# Patient Record
Sex: MALE | Race: WHITE | NOT HISPANIC OR LATINO | Employment: FULL TIME | URBAN - METROPOLITAN AREA
[De-identification: names, ages, dates, MRNs, and addresses within clinical notes are randomized per-mention and may not be internally consistent; named-entity substitution may affect disease eponyms.]

---

## 2018-01-30 ENCOUNTER — CONVERSION ENCOUNTER (OUTPATIENT)
Dept: NEUROLOGY | Facility: CLINIC | Age: 38
End: 2018-01-30

## 2018-01-30 ENCOUNTER — OFFICE VISIT CONVERTED (OUTPATIENT)
Dept: NEUROSURGERY | Facility: CLINIC | Age: 38
End: 2018-01-30
Attending: NEUROLOGICAL SURGERY

## 2018-02-27 ENCOUNTER — OFFICE VISIT CONVERTED (OUTPATIENT)
Dept: NEUROSURGERY | Facility: CLINIC | Age: 38
End: 2018-02-27
Attending: NEUROLOGICAL SURGERY

## 2018-04-23 ENCOUNTER — OFFICE VISIT CONVERTED (OUTPATIENT)
Dept: GASTROENTEROLOGY | Facility: CLINIC | Age: 38
End: 2018-04-23
Attending: PHYSICIAN ASSISTANT

## 2018-05-03 ENCOUNTER — OFFICE VISIT CONVERTED (OUTPATIENT)
Dept: NEUROSURGERY | Facility: CLINIC | Age: 38
End: 2018-05-03
Attending: NEUROLOGICAL SURGERY

## 2019-07-29 ENCOUNTER — HOSPITAL ENCOUNTER (OUTPATIENT)
Dept: LAB | Facility: HOSPITAL | Age: 39
Discharge: HOME | End: 2019-07-29
Attending: NURSE PRACTITIONER

## 2019-07-29 ENCOUNTER — OFFICE VISIT CONVERTED (OUTPATIENT)
Dept: FAMILY MEDICINE CLINIC | Facility: CLINIC | Age: 39
End: 2019-07-29
Attending: NURSE PRACTITIONER

## 2019-07-29 ENCOUNTER — CONVERSION ENCOUNTER (OUTPATIENT)
Dept: FAMILY MEDICINE CLINIC | Facility: CLINIC | Age: 39
End: 2019-07-29

## 2019-07-29 LAB
ALBUMIN SERPL-MCNC: 4.3 G/DL (ref 3.5–5)
ALBUMIN/GLOB SERPL: 1.3 {RATIO} (ref 1.4–2.6)
ALP SERPL-CCNC: 97 U/L (ref 53–128)
ALT SERPL-CCNC: 27 U/L (ref 10–40)
ANION GAP SERPL CALC-SCNC: 18 MMOL/L (ref 8–19)
APPEARANCE UR: CLEAR
AST SERPL-CCNC: 19 U/L (ref 15–50)
BILIRUB SERPL-MCNC: 0.29 MG/DL (ref 0.2–1.3)
BILIRUB UR QL: NEGATIVE
BUN SERPL-MCNC: 13 MG/DL (ref 5–25)
BUN/CREAT SERPL: 16 {RATIO} (ref 6–20)
CALCIUM SERPL-MCNC: 9.4 MG/DL (ref 8.7–10.4)
CHLORIDE SERPL-SCNC: 105 MMOL/L (ref 99–111)
CHOLEST SERPL-MCNC: 225 MG/DL (ref 107–200)
CHOLEST/HDLC SERPL: 5.5 {RATIO} (ref 3–6)
COLOR UR: YELLOW
CONV CO2: 25 MMOL/L (ref 22–32)
CONV COLLECTION SOURCE (UA): NORMAL
CONV TOTAL PROTEIN: 7.6 G/DL (ref 6.3–8.2)
CONV UROBILINOGEN IN URINE BY AUTOMATED TEST STRIP: 0.2 {EHRLICHU}/DL (ref 0.1–1)
CREAT UR-MCNC: 0.79 MG/DL (ref 0.7–1.2)
FOLATE SERPL-MCNC: 11.2 NG/ML (ref 4.8–20)
GFR SERPLBLD BASED ON 1.73 SQ M-ARVRAT: >60 ML/MIN/{1.73_M2}
GLOBULIN UR ELPH-MCNC: 3.3 G/DL (ref 2–3.5)
GLUCOSE SERPL-MCNC: 85 MG/DL (ref 70–99)
GLUCOSE UR QL: NEGATIVE MG/DL
HDLC SERPL-MCNC: 41 MG/DL (ref 40–60)
HGB UR QL STRIP: NEGATIVE
KETONES UR QL STRIP: NEGATIVE MG/DL
LDLC SERPL CALC-MCNC: 149 MG/DL (ref 70–100)
LEUKOCYTE ESTERASE UR QL STRIP: NEGATIVE
NITRITE UR QL STRIP: NEGATIVE
OSMOLALITY SERPL CALC.SUM OF ELEC: 297 MOSM/KG (ref 273–304)
PH UR STRIP.AUTO: 6 [PH] (ref 5–8)
POTASSIUM SERPL-SCNC: 3.9 MMOL/L (ref 3.5–5.3)
PROT UR QL: NEGATIVE MG/DL
SODIUM SERPL-SCNC: 144 MMOL/L (ref 135–147)
SP GR UR: 1.02 (ref 1–1.03)
T4 FREE SERPL-MCNC: 1.1 NG/DL (ref 0.9–1.8)
TRIGL SERPL-MCNC: 174 MG/DL (ref 40–150)
TSH SERPL-ACNC: 3.7 M[IU]/L (ref 0.27–4.2)
VIT B12 SERPL-MCNC: 286 PG/ML (ref 211–911)
VLDLC SERPL-MCNC: 35 MG/DL (ref 5–37)

## 2020-01-29 ENCOUNTER — OFFICE VISIT CONVERTED (OUTPATIENT)
Dept: FAMILY MEDICINE CLINIC | Facility: CLINIC | Age: 40
End: 2020-01-29
Attending: NURSE PRACTITIONER

## 2020-02-04 ENCOUNTER — HOSPITAL ENCOUNTER (OUTPATIENT)
Dept: LAB | Facility: HOSPITAL | Age: 40
Discharge: HOME | End: 2020-02-04
Attending: NURSE PRACTITIONER

## 2020-02-04 ENCOUNTER — HOSPITAL ENCOUNTER (OUTPATIENT)
Dept: SLEEP MEDICINE | Facility: HOSPITAL | Age: 40
Discharge: HOME | End: 2020-02-04
Attending: PSYCHIATRY & NEUROLOGY

## 2020-02-04 LAB
ALBUMIN SERPL-MCNC: 4.6 G/DL (ref 3.5–5)
ALBUMIN/GLOB SERPL: 1.2 {RATIO} (ref 1.4–2.6)
ALP SERPL-CCNC: 101 U/L (ref 53–128)
ALT SERPL-CCNC: 35 U/L (ref 10–40)
ANION GAP SERPL CALC-SCNC: 21 MMOL/L (ref 8–19)
AST SERPL-CCNC: 23 U/L (ref 15–50)
BASOPHILS # BLD AUTO: 0.06 10*3/UL (ref 0–0.2)
BASOPHILS NFR BLD AUTO: 0.6 % (ref 0–3)
BILIRUB SERPL-MCNC: 0.33 MG/DL (ref 0.2–1.3)
BUN SERPL-MCNC: 12 MG/DL (ref 5–25)
BUN/CREAT SERPL: 12 {RATIO} (ref 6–20)
CALCIUM SERPL-MCNC: 10.1 MG/DL (ref 8.7–10.4)
CHLORIDE SERPL-SCNC: 98 MMOL/L (ref 99–111)
CHOLEST SERPL-MCNC: 254 MG/DL (ref 107–200)
CHOLEST/HDLC SERPL: 6 {RATIO} (ref 3–6)
CONV ABS IMM GRAN: 0.06 10*3/UL (ref 0–0.2)
CONV CO2: 25 MMOL/L (ref 22–32)
CONV IMMATURE GRAN: 0.6 % (ref 0–1.8)
CONV TOTAL PROTEIN: 8.3 G/DL (ref 6.3–8.2)
CREAT UR-MCNC: 1.01 MG/DL (ref 0.7–1.2)
DEPRECATED RDW RBC AUTO: 42.6 FL (ref 35.1–43.9)
EOSINOPHIL # BLD AUTO: 0.04 10*3/UL (ref 0–0.7)
EOSINOPHIL # BLD AUTO: 0.4 % (ref 0–7)
ERYTHROCYTE [DISTWIDTH] IN BLOOD BY AUTOMATED COUNT: 13.2 % (ref 11.6–14.4)
FOLATE SERPL-MCNC: 12.8 NG/ML (ref 4.8–20)
GFR SERPLBLD BASED ON 1.73 SQ M-ARVRAT: >60 ML/MIN/{1.73_M2}
GLOBULIN UR ELPH-MCNC: 3.7 G/DL (ref 2–3.5)
GLUCOSE SERPL-MCNC: 101 MG/DL (ref 70–99)
HCT VFR BLD AUTO: 51.6 % (ref 42–52)
HDLC SERPL-MCNC: 42 MG/DL (ref 40–60)
HGB BLD-MCNC: 16.6 G/DL (ref 14–18)
LDLC SERPL CALC-MCNC: 173 MG/DL (ref 70–100)
LYMPHOCYTES # BLD AUTO: 3.36 10*3/UL (ref 1–5)
LYMPHOCYTES NFR BLD AUTO: 35.3 % (ref 20–45)
MCH RBC QN AUTO: 28.3 PG (ref 27–31)
MCHC RBC AUTO-ENTMCNC: 32.2 G/DL (ref 33–37)
MCV RBC AUTO: 88.1 FL (ref 80–96)
MONOCYTES # BLD AUTO: 0.72 10*3/UL (ref 0.2–1.2)
MONOCYTES NFR BLD AUTO: 7.6 % (ref 3–10)
NEUTROPHILS # BLD AUTO: 5.29 10*3/UL (ref 2–8)
NEUTROPHILS NFR BLD AUTO: 55.5 % (ref 30–85)
NRBC CBCN: 0 % (ref 0–0.7)
OSMOLALITY SERPL CALC.SUM OF ELEC: 290 MOSM/KG (ref 273–304)
PLATELET # BLD AUTO: 324 10*3/UL (ref 130–400)
PMV BLD AUTO: 10.6 FL (ref 9.4–12.4)
POTASSIUM SERPL-SCNC: 3.7 MMOL/L (ref 3.5–5.3)
RBC # BLD AUTO: 5.86 10*6/UL (ref 4.7–6.1)
SODIUM SERPL-SCNC: 140 MMOL/L (ref 135–147)
T4 FREE SERPL-MCNC: 1.1 NG/DL (ref 0.9–1.8)
TESTOST SERPL-MCNC: 265 NG/DL (ref 249–836)
TRIGL SERPL-MCNC: 194 MG/DL (ref 40–150)
TSH SERPL-ACNC: 4.69 M[IU]/L (ref 0.27–4.2)
VIT B12 SERPL-MCNC: 312 PG/ML (ref 211–911)
VLDLC SERPL-MCNC: 39 MG/DL (ref 5–37)
WBC # BLD AUTO: 9.53 10*3/UL (ref 4.8–10.8)

## 2020-10-23 ENCOUNTER — OFFICE VISIT CONVERTED (OUTPATIENT)
Dept: FAMILY MEDICINE CLINIC | Facility: CLINIC | Age: 40
End: 2020-10-23
Attending: NURSE PRACTITIONER

## 2020-10-23 ENCOUNTER — CONVERSION ENCOUNTER (OUTPATIENT)
Dept: FAMILY MEDICINE CLINIC | Facility: CLINIC | Age: 40
End: 2020-10-23

## 2020-10-30 ENCOUNTER — HOSPITAL ENCOUNTER (OUTPATIENT)
Dept: LAB | Facility: HOSPITAL | Age: 40
Discharge: HOME | End: 2020-10-30
Attending: NURSE PRACTITIONER

## 2020-10-30 LAB
ALBUMIN SERPL-MCNC: 4.3 G/DL (ref 3.5–5)
ALBUMIN/GLOB SERPL: 1.2 {RATIO} (ref 1.4–2.6)
ALP SERPL-CCNC: 102 U/L (ref 53–128)
ALT SERPL-CCNC: 32 U/L (ref 10–40)
ANION GAP SERPL CALC-SCNC: 20 MMOL/L (ref 8–19)
APPEARANCE UR: ABNORMAL
AST SERPL-CCNC: 21 U/L (ref 15–50)
BASOPHILS # BLD AUTO: 0.07 10*3/UL (ref 0–0.2)
BASOPHILS NFR BLD AUTO: 0.7 % (ref 0–3)
BILIRUB SERPL-MCNC: 0.47 MG/DL (ref 0.2–1.3)
BILIRUB UR QL: NEGATIVE
BUN SERPL-MCNC: 17 MG/DL (ref 5–25)
BUN/CREAT SERPL: 19 {RATIO} (ref 6–20)
CALCIUM SERPL-MCNC: 9.4 MG/DL (ref 8.7–10.4)
CHLORIDE SERPL-SCNC: 98 MMOL/L (ref 99–111)
CHOLEST SERPL-MCNC: 224 MG/DL (ref 107–200)
CHOLEST/HDLC SERPL: 4.9 {RATIO} (ref 3–6)
COLOR UR: YELLOW
CONV ABS IMM GRAN: 0.04 10*3/UL (ref 0–0.2)
CONV BACTERIA: NEGATIVE
CONV CO2: 25 MMOL/L (ref 22–32)
CONV COLLECTION SOURCE (UA): ABNORMAL
CONV HYALINE CASTS IN URINE MICRO: ABNORMAL /[LPF]
CONV IMMATURE GRAN: 0.4 % (ref 0–1.8)
CONV TOTAL PROTEIN: 7.8 G/DL (ref 6.3–8.2)
CONV UROBILINOGEN IN URINE BY AUTOMATED TEST STRIP: 0.2 {EHRLICHU}/DL (ref 0.1–1)
CREAT UR-MCNC: 0.88 MG/DL (ref 0.7–1.2)
DEPRECATED RDW RBC AUTO: 42.2 FL (ref 35.1–43.9)
EOSINOPHIL # BLD AUTO: 0.07 10*3/UL (ref 0–0.7)
EOSINOPHIL # BLD AUTO: 0.7 % (ref 0–7)
ERYTHROCYTE [DISTWIDTH] IN BLOOD BY AUTOMATED COUNT: 13.3 % (ref 11.6–14.4)
GFR SERPLBLD BASED ON 1.73 SQ M-ARVRAT: >60 ML/MIN/{1.73_M2}
GLOBULIN UR ELPH-MCNC: 3.5 G/DL (ref 2–3.5)
GLUCOSE SERPL-MCNC: 89 MG/DL (ref 70–99)
GLUCOSE UR QL: NEGATIVE MG/DL
HCT VFR BLD AUTO: 49.3 % (ref 42–52)
HDLC SERPL-MCNC: 46 MG/DL (ref 40–60)
HGB BLD-MCNC: 16.1 G/DL (ref 14–18)
HGB UR QL STRIP: NEGATIVE
KETONES UR QL STRIP: NEGATIVE MG/DL
LDLC SERPL CALC-MCNC: 150 MG/DL (ref 70–100)
LEUKOCYTE ESTERASE UR QL STRIP: NEGATIVE
LYMPHOCYTES # BLD AUTO: 3.55 10*3/UL (ref 1–5)
LYMPHOCYTES NFR BLD AUTO: 35 % (ref 20–45)
MCH RBC QN AUTO: 28.4 PG (ref 27–31)
MCHC RBC AUTO-ENTMCNC: 32.7 G/DL (ref 33–37)
MCV RBC AUTO: 86.9 FL (ref 80–96)
MONOCYTES # BLD AUTO: 0.92 10*3/UL (ref 0.2–1.2)
MONOCYTES NFR BLD AUTO: 9.1 % (ref 3–10)
NEUTROPHILS # BLD AUTO: 5.5 10*3/UL (ref 2–8)
NEUTROPHILS NFR BLD AUTO: 54.1 % (ref 30–85)
NITRITE UR QL STRIP: NEGATIVE
NRBC CBCN: 0 % (ref 0–0.7)
OSMOLALITY SERPL CALC.SUM OF ELEC: 291 MOSM/KG (ref 273–304)
PH UR STRIP.AUTO: 6 [PH] (ref 5–8)
PLATELET # BLD AUTO: 286 10*3/UL (ref 130–400)
PMV BLD AUTO: 11.1 FL (ref 9.4–12.4)
POTASSIUM SERPL-SCNC: 3.2 MMOL/L (ref 3.5–5.3)
PROT UR QL: 30 MG/DL
RBC # BLD AUTO: 5.67 10*6/UL (ref 4.7–6.1)
RBC #/AREA URNS HPF: ABNORMAL /[HPF]
SODIUM SERPL-SCNC: 140 MMOL/L (ref 135–147)
SP GR UR: 1.02 (ref 1–1.03)
T4 FREE SERPL-MCNC: 1.3 NG/DL (ref 0.9–1.8)
TRIGL SERPL-MCNC: 141 MG/DL (ref 40–150)
TSH SERPL-ACNC: 4.56 M[IU]/L (ref 0.27–4.2)
VLDLC SERPL-MCNC: 28 MG/DL (ref 5–37)
WBC # BLD AUTO: 10.15 10*3/UL (ref 4.8–10.8)
WBC #/AREA URNS HPF: ABNORMAL /[HPF]

## 2021-01-22 ENCOUNTER — OFFICE VISIT CONVERTED (OUTPATIENT)
Dept: FAMILY MEDICINE CLINIC | Facility: CLINIC | Age: 41
End: 2021-01-22
Attending: NURSE PRACTITIONER

## 2021-02-24 ENCOUNTER — HOSPITAL ENCOUNTER (OUTPATIENT)
Dept: LAB | Facility: HOSPITAL | Age: 41
Discharge: HOME | End: 2021-02-24
Attending: NURSE PRACTITIONER

## 2021-02-24 LAB
ALBUMIN SERPL-MCNC: 4.1 G/DL (ref 3.5–5)
ALBUMIN/GLOB SERPL: 1.2 {RATIO} (ref 1.4–2.6)
ALP SERPL-CCNC: 93 U/L (ref 53–128)
ALT SERPL-CCNC: 33 U/L (ref 10–40)
ANION GAP SERPL CALC-SCNC: 17 MMOL/L (ref 8–19)
AST SERPL-CCNC: 20 U/L (ref 15–50)
BILIRUB SERPL-MCNC: 0.33 MG/DL (ref 0.2–1.3)
BUN SERPL-MCNC: 16 MG/DL (ref 5–25)
BUN/CREAT SERPL: 17 {RATIO} (ref 6–20)
CALCIUM SERPL-MCNC: 9.3 MG/DL (ref 8.7–10.4)
CHLORIDE SERPL-SCNC: 100 MMOL/L (ref 99–111)
CHOLEST SERPL-MCNC: 244 MG/DL (ref 107–200)
CHOLEST/HDLC SERPL: 5.2 {RATIO} (ref 3–6)
CONV CO2: 29 MMOL/L (ref 22–32)
CONV TOTAL PROTEIN: 7.5 G/DL (ref 6.3–8.2)
CREAT UR-MCNC: 0.94 MG/DL (ref 0.7–1.2)
GFR SERPLBLD BASED ON 1.73 SQ M-ARVRAT: >60 ML/MIN/{1.73_M2}
GLOBULIN UR ELPH-MCNC: 3.4 G/DL (ref 2–3.5)
GLUCOSE SERPL-MCNC: 95 MG/DL (ref 70–99)
HDLC SERPL-MCNC: 47 MG/DL (ref 40–60)
LDLC SERPL CALC-MCNC: 152 MG/DL (ref 70–100)
OSMOLALITY SERPL CALC.SUM OF ELEC: 295 MOSM/KG (ref 273–304)
POTASSIUM SERPL-SCNC: 3.7 MMOL/L (ref 3.5–5.3)
SODIUM SERPL-SCNC: 142 MMOL/L (ref 135–147)
T4 FREE SERPL-MCNC: 1.2 NG/DL (ref 0.9–1.8)
TRIGL SERPL-MCNC: 226 MG/DL (ref 40–150)
TSH SERPL-ACNC: 3 M[IU]/L (ref 0.27–4.2)
VLDLC SERPL-MCNC: 45 MG/DL (ref 5–37)

## 2021-05-13 NOTE — PROGRESS NOTES
Progress Note      Patient Name: Fernando Verde   Patient ID: [id number]   Sex: Male   YOB: 1980    Primary Care Provider: Luis Armando BELTRAN    Visit Date: October 23, 2020    Provider: DEVIN Kraft   Location: [facility]   Location Address: [address] [place] [address], [address]  [place], [place]  [id number]   Location Phone: [phone]          Chief Complaint     The patient is here for a physical       History Of Present Illness  Fernando Verde is a 40 year old /White male who presents for evaluation and treatment of:      Hyperlipidemia:  hasn't taken medication in awhile    Hypertension:122/82.  Doing well on medication.    Arthralgia:  doing well on medication.    Weight loss :  has concern about weight and would like to discuss med options. Has tried to do other weight manage diets.    Depression: Anything that feels good wife is against.  Wife doesn't want him to work. Wife doesn't want him to get sleep study due to money.  has been able to hide but not lately.       Past Medical History  Disease Name Date Onset Notes   Allergic conjunctivitis and rhinitis --  --    Arthritis --  --    Asthma --  --    B12 deficiency anemia 01/29/2020 --    Broken Bones --  --    Depressive Disorder --  --    Essential hypertension 01/29/2020 --    Forgetfulness --  --    Frequent urination --  --    Hearing loss --  --    Hemorrhoids --  --    Hyperlipidemia --  --    Hyperlipidemia 01/29/2020 --    Hypertension --  --    Lumbago/low back pain 07/20/2016 --    Migraine headache --  --    Night sweats --  --    Otalgia --  --    Recurrent otitis media --  --    Sciatica 07/20/2016 bilateral   Shortness of Breath --  --    Spondylolisthesis , lumbar 07/27/2016 --    Tinnitus, left ear --  --          Past Surgical History  Procedure Name Date Notes   Arm Surgery 2014,2015 --    Colonoscopy 2017 --    EGD 2017 --    Elbow Surgery 2014, 2016 --    Radial Fracture 2013 --     Removal of hardware 2015 --          Medication List  Name Date Started Instructions   atorvastatin 10 mg oral tablet 02/05/2020 take 1 tablet (10 mg) by oral route once daily at bedtime for 30 days   hydrochlorothiazide 25 mg oral tablet 01/29/2020 take 1 tablet (25 mg) by oral route once daily for 90 days   ibuprofen 800 mg oral tablet  take 1 tablet by oral route 3 times a day as needed   Synthroid 25 mcg oral tablet 02/05/2020 take 1 tablet (25 mcg) by oral route once daily on an empty stomach 30 minutes before breakfast for 30 days         Allergy List  Allergen Name Date Reaction Notes   lisinopril 08/2015 cough --          Family Medical History  Disease Name Relative/Age Notes   Breast Neoplasm, Malignant Grandmother (maternal)/   grandparent/not specified   Psychiatric disorder Mother/   --    Lung Neoplasm, Malignant Grandmother (maternal)/   grandparent/not specified   Stroke Grandfather (maternal)/   grandparents/not specified   Heart Disease Grandfather (paternal)/   grandparents/not specified   - No Family History of Colorectal Cancer  --    Leukemia Aunt/   --    Diabetes Grandfather (paternal)/  Grandmother (maternal)/   grandparents/not specified         Social History  Finding Status Start/Stop Quantity Notes   Alcohol Current some day --/-- occasional --     --  --/-- --  three children   Tobacco Former 14/36 --  Quit 2017   Unemployed --  --/-- --  --          Immunizations  NameDate Admin Mfg Trade Name Lot Number Route Inj VIS Given VIS Publication   Olxexvivi29/13/2020 SKB Fluarix, quadrivalent, preservative free [id number] NE NE 10/23/2020    Comments:    Eidotwdfhkgh62/24/2015 MSD PNEUMOVAX 23 [id number] NE  10/27/2015    Comments: [facility]         Review of Systems  · Constitutional  o Denies  o : fever, fatigue, weight loss, weight gain  · Cardiovascular  o Denies  o : lower extremity edema, claudication, chest pressure, palpitations  · Respiratory  o Denies  o : shortness  of breath, wheezing, cough, hemoptysis, dyspnea on exertion  · Gastrointestinal  o Denies  o : nausea, vomiting, diarrhea, constipation, abdominal pain      Vitals  Date Time BP Position Site L\R Cuff Size HR RR TEMP (F) WT  HT  BMI kg/m2 BSA m2 O2 Sat FR L/min FiO2 HC       10/23/2020 08:06 /82 Sitting    83 - R 16 98.2 274lbs 0oz 6'   37.16 2.51 97 %  21%          Physical Examination  · Constitutional  o Appearance  o : well-nourished, well developed, alert, in no acute distress  · Eyes  o Conjunctivae  o : conjunctivae normal  o Sclerae  o : sclerae white  o Pupils and Irises  o : pupils equal, round, and reactive to light and accommodation bilaterally  o Corneas  o : tear film normal, no lesions present  o Eyelids/Ocular Adnexae  o : eyelid appearance normal, no exudates present, eye moisture level normal  · Respiratory  o Respiratory Effort  o : breathing unlabored  o Inspection of Chest  o : normal appearance, no retractions  o Auscultation of Lungs  o : normal breath sounds throughout  · Cardiovascular  o Heart  o :   § Auscultation of Heart  § : regular rate and rhythm without murmur, PMI normal  o Peripheral Vascular System  o :   § Extremities  § : no cyanosis, clubbing or edema; less than 2 second refill noted  · Musculoskeletal  o General  o : No joint swelling or deformity noted. Muscle tone, strength and development grossly normal.  · Skin and Subcutaneous Tissue  o General Inspection  o : no rashes or lesions present, no areas of discoloration  · Neurologic  o Mental Status Examination  o : judgement, insight intact, modd and affect appropriate  o Motor Examination  o : strength grossly intact in all four extremities  o Gait and Station  o : normal gait, able to stand without difficulty          Assessment  · Screening for depression     V79.0/Z13.89  · Essential hypertension     401.9/I10  · Major depressive disorder     296.20/F32.2  · Obesity     278.00/E66.9  · Other long term (current)  drug therapy     V58.69/Z79.899  · BMI 37.0-37.9, adult     V85.37/Z68.37  · Elevated TSH     794.5/R79.89  · Elevated cholesterol     272.0/E78.00      Plan  · Orders  o Annual depression screening, 15 minutes (84574, ) - V79.0/Z13.89 - 10/23/2020  o ACO-18: Positive screen for clinical depression using a standardized tool and a follow-up plan documented () - V79.0/Z13.89 - 10/23/2020  o HTN/Lipid Panel (CMP, Lipid) [facility] (13409, 57495) - 401.9/I10 - 10/23/2020  o CBC with Auto Diff [facility] (30879) - 401.9/I10 - 10/23/2020  o Urinalysis with Reflex Microscopy ([facility]) (19786) - 401.9/I10 - 10/23/2020  o Thyroid Profile (49951, 32027, THYII) - 794.5/R79.89 - 10/23/2020  o ACO-39: Current medications updated and reviewed (1159F, ) - - 10/23/2020  o ACO-14: Influenza immunization administered or previously received [facility] () - - 10/23/2020  · Medications  o Contrave 8-90 mg oral tablet extended release   SIG: take 2 tablets by oral route 2 times per day in the morning and evening for 30 days   DISP: (120) Tablet with 2 refills  Prescribed on 10/23/2020     o ibuprofen 800 mg oral tablet   SIG: take 1 tablet by oral route 3 times a day as needed for 90 days   DISP: (270) Tablet with 1 refills  Prescribed on 10/23/2020     o atorvastatin 10 mg oral tablet   SIG: take 1 tablet (10 mg) by oral route once daily at bedtime for 90 days   DISP: (90) Tablet with 1 refills  Refilled on 10/23/2020     o hydrochlorothiazide 25 mg oral tablet   SIG: take 1 tablet (25 mg) by oral route once daily for 90 days   DISP: (90) Tablet with 1 refills  Refilled on 10/23/2020     o Medications have been Reconciled  o Transition of Care or Provider Policy  · Instructions  o Depression Screen completed and scanned into the EMR under the designated folder within the patient's documents.  o Today's PHQ-9 result is _18__  o The provider screening met the required time of 15 minutes.  o Patient was educated/instructed on their diagnosis,  treatment and medications prior to discharge from the clinic today.  o Minutes spent with patient including greater than 50% in Education/Counseling/Care Coordination.  o Time spent with the patient was minutes, more than 50% face to face.  · Disposition  o Return in 3 months for F/U            Electronically Signed by: DEVIN Kraft -Author on October 23, 2020 09:15:29 AM

## 2021-05-14 VITALS
HEIGHT: 72 IN | HEART RATE: 83 BPM | RESPIRATION RATE: 16 BRPM | BODY MASS INDEX: 37.11 KG/M2 | OXYGEN SATURATION: 97 % | TEMPERATURE: 98.2 F | WEIGHT: 274 LBS | SYSTOLIC BLOOD PRESSURE: 124 MMHG | DIASTOLIC BLOOD PRESSURE: 82 MMHG

## 2021-05-14 VITALS
WEIGHT: 274 LBS | DIASTOLIC BLOOD PRESSURE: 84 MMHG | HEART RATE: 67 BPM | OXYGEN SATURATION: 96 % | BODY MASS INDEX: 37.11 KG/M2 | SYSTOLIC BLOOD PRESSURE: 132 MMHG | HEIGHT: 72 IN | RESPIRATION RATE: 16 BRPM | TEMPERATURE: 96.6 F

## 2021-05-14 NOTE — PROGRESS NOTES
Progress Note      Patient Name: Fernando Verde   Patient ID: [id number]   Sex: Male   YOB: 1980    Primary Care Provider: Luis Armando BELTRAN    Visit Date: January 22, 2021    Provider: DEVIN Kraft   Location: [facility]   Location Address: [address] [place] [address], [address]  [place], [place]  [id number]   Location Phone: [phone]          Chief Complaint     The patient is here for a three month f/u htn, hypothyroid, hyperlipidemia, weight management.       History Of Present Illness  Fernando Verde is a 40 year old /White male who presents for evaluation and treatment of:      3-month f/u. Patient states he has not been taking Atorvastatin, Contrave, or Synthroid for the last 3 months. He would like to see what new lab results are before re-starting these medications. Patient reports daytime sleepiness and snoring, would like to have a sleep study done.     Hyperlipidemia:  wants to recheck his cholesterol before restarting medication if needed.    Obesity:  difficulty getting medication from pharmacy due to hours of operation and pandemic restrictions per patient.  weight has remained the same.    Hypthyroidism.  would like labs before deciding to restart medication.  Has been off for 3 months       Past Medical History  Disease Name Date Onset Notes   Allergic conjunctivitis and rhinitis --  --    Arthritis --  --    Asthma --  --    B12 deficiency anemia 01/29/2020 --    Broken Bones --  --    Depressive Disorder --  --    Essential hypertension 01/29/2020 --    Forgetfulness --  --    Frequent urination --  --    Hearing loss --  --    Hemorrhoids --  --    Hyperlipidemia --  --    Hyperlipidemia 01/29/2020 --    Hypertension --  --    Lumbago/low back pain 07/20/2016 --    Migraine headache --  --    Night sweats --  --    Otalgia --  --    Recurrent otitis media --  --    Sciatica 07/20/2016 bilateral   Shortness of Breath --  --    Spondylolisthesis ,  lumbar 07/27/2016 --    Tinnitus, left ear --  --          Past Surgical History  Procedure Name Date Notes   Arm Surgery 2014,2015 --    Colonoscopy 2017 --    EGD 2017 --    Elbow Surgery 2014, 2016 --    Radial Fracture 2013 --    Removal of hardware 2015 --          Medication List  Name Date Started Instructions   atorvastatin 20 mg oral tablet 10/30/2020 take 1 tablet (20 mg) by oral route once daily for 90 days   Contrave 8-90 mg oral tablet extended release 10/23/2020 take 2 tablets by oral route 2 times per day in the morning and evening for 30 days   hydrochlorothiazide 25 mg oral tablet 10/23/2020 take 1 tablet (25 mg) by oral route once daily for 90 days   ibuprofen 800 mg oral tablet 10/23/2020 take 1 tablet by oral route 3 times a day as needed for 90 days   Synthroid 25 mcg oral tablet 10/30/2020 take 1 tablet (25 mcg) by oral route once daily on an empty stomach 30 minutes before breakfast for 30 days         Allergy List  Allergen Name Date Reaction Notes   lisinopril 08/2015 cough --          Family Medical History  Disease Name Relative/Age Notes   Breast Neoplasm, Malignant Grandmother (maternal)/   grandparent/not specified   Psychiatric disorder Mother/   --    Lung Neoplasm, Malignant Grandmother (maternal)/   grandparent/not specified   Stroke Grandfather (maternal)/   grandparents/not specified   Heart Disease Grandfather (paternal)/   grandparents/not specified   - No Family History of Colorectal Cancer  --    Leukemia Aunt/   --    Diabetes Grandfather (paternal)/  Grandmother (maternal)/   grandparents/not specified         Social History  Finding Status Start/Stop Quantity Notes   Alcohol Current some day --/-- occasional --     --  --/-- --  three children   Tobacco Former 14/36 --  Quit 2017   Unemployed --  --/-- --  --          Immunizations  NameDate Admin Mfg Trade Name Lot Number Route Inj VIS Given VIS Publication   Qpopehbqh98/13/2020 SKB Fluarix, quadrivalent,  preservative free [id number] NE NE 10/23/2020    Comments:    Lytslrusculq98/24/2015 MSD PNEUMOVAX 23 [id number] NE  10/27/2015    Comments: [facility]         Review of Systems  · Constitutional  o Denies  o : fever, fatigue, weight loss, weight gain  · Cardiovascular  o Denies  o : lower extremity edema, claudication, chest pressure, palpitations  · Respiratory  o Denies  o : shortness of breath, wheezing, cough, hemoptysis, dyspnea on exertion  · Gastrointestinal  o Denies  o : nausea, vomiting, diarrhea, constipation, abdominal pain      Vitals  Date Time BP Position Site L\R Cuff Size HR RR TEMP (F) WT  HT  BMI kg/m2 BSA m2 O2 Sat FR L/min FiO2 HC       01/22/2021 08:34 /84 Sitting    67 - R 16 96.6 274lbs 0oz 6'   37.16 2.51 96 %  21%          Physical Examination  · Constitutional  o Appearance  o : well-nourished, well developed, alert, in no acute distress  · Eyes  o Conjunctivae  o : conjunctivae normal  o Sclerae  o : sclerae white  o Pupils and Irises  o : pupils equal, round, and reactive to light and accommodation bilaterally  o Corneas  o : tear film normal, no lesions present  o Eyelids/Ocular Adnexae  o : eyelid appearance normal, no exudates present, eye moisture level normal  · Neck  o Inspection/Palpation  o : normal appearance, no masses or tenderness, trachea midline, no enlarged cervical or supraclavicular lymphnodes palpated  o Thyroid  o : gland size normal, nontender, no nodules or masses present on palpation, thyroid motion normal during swallowing  · Respiratory  o Respiratory Effort  o : breathing unlabored  o Inspection of Chest  o : normal appearance, no retractions  o Auscultation of Lungs  o : normal breath sounds throughout  · Cardiovascular  o Heart  o :   § Auscultation of Heart  § : regular rate and rhythm without murmur, PMI normal  o Peripheral Vascular System  o :   § Carotid Arteries  § : normal pulses bilaterally, no bruits present  § Extremities  § : no cyanosis,  clubbing or edema; less than 2 second refill noted  · Musculoskeletal  o General  o : No joint swelling or deformity noted. Muscle tone, strength and development grossly normal.  · Skin and Subcutaneous Tissue  o General Inspection  o : no rashes or lesions present, no areas of discoloration  · Neurologic  o Mental Status Examination  o : judgement, insight intact, modd and affect appropriate  o Motor Examination  o : strength grossly intact in all four extremities  o Gait and Station  o : normal gait, able to stand without difficulty          Assessment  · Hyperlipidemia     272.4/E78.5  · Hypothyroidism     244.9/E03.9  · Obesity     278.00/E66.9  · BMI 36.0-36.9,adult     V85.36/Z68.36  · Snoring     786.09/R06.83  · Daytime sleepiness     780.54/R40.0      Plan  · Orders  o HTN/Lipid Panel (CMP, Lipid) [facility] (71670, 71773) - 272.4/E78.5 - 01/22/2021  o Thyroid Profile (73869, THYII, 30348) - 244.9/E03.9 - 01/22/2021  o ACO-14: Influenza immunization administered or previously received [facility] () - - 01/22/2021  o ACO-39: Current medications updated and reviewed (, 1159F) - - 01/22/2021  o Sleep Disorder Clinic Consultation (SLEEP) - 786.09/R06.83, 780.54/R40.0 - 01/22/2021  · Medications  o Medications have been Reconciled  o Transition of Care or Provider Policy  · Instructions  o Advised that cheeses and other sources of dairy fats, animal fats, fast food, and the extras (candy, pastries, pies, doughnuts and cookies) all contain LDL raising nutrients. Advised to increase fruits, vegetables, whole grains, and to monitor portion sizes.   o Patient was educated/instructed on their diagnosis, treatment and medications prior to discharge from the clinic today.  o Minutes spent with patient including greater than 50% in Education/Counseling/Care Coordination.  o Time spent with the patient was minutes, more than 50% face to face.  · Disposition  o Return in 6 months            Electronically Signed by: Luis Armando  DEVIN Padron -Author on January 22, 2021 09:28:11 AM

## 2021-05-15 VITALS
HEART RATE: 101 BPM | WEIGHT: 272 LBS | OXYGEN SATURATION: 95 % | RESPIRATION RATE: 16 BRPM | SYSTOLIC BLOOD PRESSURE: 146 MMHG | BODY MASS INDEX: 36.84 KG/M2 | HEIGHT: 72 IN | TEMPERATURE: 98.3 F | DIASTOLIC BLOOD PRESSURE: 110 MMHG

## 2021-05-15 VITALS
HEIGHT: 72 IN | SYSTOLIC BLOOD PRESSURE: 144 MMHG | HEART RATE: 71 BPM | WEIGHT: 266 LBS | DIASTOLIC BLOOD PRESSURE: 105 MMHG | TEMPERATURE: 96.9 F | OXYGEN SATURATION: 96 % | RESPIRATION RATE: 16 BRPM | BODY MASS INDEX: 36.03 KG/M2

## 2021-05-15 VITALS — SYSTOLIC BLOOD PRESSURE: 138 MMHG | DIASTOLIC BLOOD PRESSURE: 84 MMHG

## 2021-05-16 VITALS
RESPIRATION RATE: 12 BRPM | WEIGHT: 274 LBS | HEIGHT: 72 IN | OXYGEN SATURATION: 97 % | BODY MASS INDEX: 37.11 KG/M2 | DIASTOLIC BLOOD PRESSURE: 92 MMHG | SYSTOLIC BLOOD PRESSURE: 130 MMHG | HEART RATE: 80 BPM

## 2021-05-16 VITALS
DIASTOLIC BLOOD PRESSURE: 81 MMHG | SYSTOLIC BLOOD PRESSURE: 121 MMHG | WEIGHT: 269 LBS | HEIGHT: 72 IN | BODY MASS INDEX: 36.44 KG/M2

## 2021-05-16 VITALS
BODY MASS INDEX: 36.57 KG/M2 | SYSTOLIC BLOOD PRESSURE: 157 MMHG | WEIGHT: 270 LBS | DIASTOLIC BLOOD PRESSURE: 97 MMHG | HEIGHT: 72 IN

## 2021-05-16 VITALS
BODY MASS INDEX: 36.7 KG/M2 | HEIGHT: 72 IN | WEIGHT: 271 LBS | SYSTOLIC BLOOD PRESSURE: 119 MMHG | DIASTOLIC BLOOD PRESSURE: 84 MMHG

## 2021-06-10 DIAGNOSIS — I10 HYPERTENSION, UNSPECIFIED TYPE: Primary | ICD-10-CM

## 2021-06-10 NOTE — TELEPHONE ENCOUNTER
Caller: Fernando Verde    Relationship: Self    Best call back number: [phone]    Medication needed:   HYDROCHLOROTHIAZIDE    When do you need the refill by: ASAP      Does the patient have less than a 3 day supply:  [x] Yes  [] No    What is the patient's preferred pharmacy: [facility]  [place]

## 2021-06-12 RX ORDER — HYDROCHLOROTHIAZIDE 25 MG/1
25 TABLET ORAL DAILY
Qty: 90 TABLET | Refills: 1 | Status: SHIPPED | OUTPATIENT
Start: 2021-06-12 | End: 2021-07-22 | Stop reason: SDUPTHER

## 2021-06-12 RX ORDER — HYDROCHLOROTHIAZIDE 25 MG/1
TABLET ORAL
COMMUNITY
Start: 2021-04-02 | End: 2021-06-12 | Stop reason: SDUPTHER

## 2021-06-23 ENCOUNTER — TRANSCRIBE ORDERS (OUTPATIENT)
Dept: ADMINISTRATIVE | Facility: HOSPITAL | Age: 41
End: 2021-06-23

## 2021-06-23 ENCOUNTER — LAB (OUTPATIENT)
Dept: LAB | Facility: HOSPITAL | Age: 41
End: 2021-06-23

## 2021-06-23 DIAGNOSIS — Z13.9 ENCOUNTER FOR HEALTH-RELATED SCREENING: Primary | ICD-10-CM

## 2021-06-23 DIAGNOSIS — Z13.9 ENCOUNTER FOR HEALTH-RELATED SCREENING: ICD-10-CM

## 2021-06-23 LAB — HBV SURFACE AB SER RIA-ACNC: NORMAL

## 2021-06-23 PROCEDURE — 36415 COLL VENOUS BLD VENIPUNCTURE: CPT

## 2021-06-23 PROCEDURE — 86706 HEP B SURFACE ANTIBODY: CPT

## 2021-07-22 ENCOUNTER — OFFICE VISIT (OUTPATIENT)
Dept: FAMILY MEDICINE CLINIC | Facility: CLINIC | Age: 41
End: 2021-07-22

## 2021-07-22 VITALS
BODY MASS INDEX: 37.52 KG/M2 | HEIGHT: 72 IN | HEART RATE: 78 BPM | OXYGEN SATURATION: 96 % | WEIGHT: 277 LBS | SYSTOLIC BLOOD PRESSURE: 118 MMHG | TEMPERATURE: 97.1 F | DIASTOLIC BLOOD PRESSURE: 86 MMHG | RESPIRATION RATE: 16 BRPM

## 2021-07-22 DIAGNOSIS — E78.2 MIXED HYPERLIPIDEMIA: ICD-10-CM

## 2021-07-22 DIAGNOSIS — F41.8 SITUATIONAL ANXIETY: Primary | ICD-10-CM

## 2021-07-22 DIAGNOSIS — I10 HYPERTENSION, UNSPECIFIED TYPE: ICD-10-CM

## 2021-07-22 DIAGNOSIS — I10 ESSENTIAL HYPERTENSION: ICD-10-CM

## 2021-07-22 PROBLEM — H91.90 HEARING LOSS: Status: ACTIVE | Noted: 2021-07-22

## 2021-07-22 PROBLEM — G43.909 MIGRAINE HEADACHE: Status: ACTIVE | Noted: 2021-07-22

## 2021-07-22 PROBLEM — J45.909 ASTHMA: Status: ACTIVE | Noted: 2021-07-22

## 2021-07-22 PROBLEM — T14.8XXA BROKEN BONES: Status: ACTIVE | Noted: 2021-07-22

## 2021-07-22 PROBLEM — H93.12 TINNITUS, LEFT EAR: Status: ACTIVE | Noted: 2021-07-22

## 2021-07-22 PROBLEM — R35.0 FREQUENT URINATION: Status: ACTIVE | Noted: 2021-07-22

## 2021-07-22 PROBLEM — E53.8 VITAMIN B12 DEFICIENCY: Status: ACTIVE | Noted: 2021-07-22

## 2021-07-22 PROBLEM — M41.9 SCOLIOSIS: Status: ACTIVE | Noted: 2021-07-22

## 2021-07-22 PROBLEM — E55.9 VITAMIN D DEFICIENCY: Status: ACTIVE | Noted: 2021-07-22

## 2021-07-22 PROBLEM — F32.A DEPRESSIVE DISORDER: Status: ACTIVE | Noted: 2021-07-22

## 2021-07-22 PROBLEM — D51.9 B12 DEFICIENCY ANEMIA: Status: ACTIVE | Noted: 2020-01-29

## 2021-07-22 PROBLEM — R68.89 FORGETFULNESS: Status: ACTIVE | Noted: 2021-07-22

## 2021-07-22 PROBLEM — K64.9 HEMORRHOIDS: Status: ACTIVE | Noted: 2021-07-22

## 2021-07-22 PROBLEM — R06.02 SHORTNESS OF BREATH: Status: ACTIVE | Noted: 2021-07-22

## 2021-07-22 PROBLEM — Z88.9 ALLERGIC CONDITION: Status: ACTIVE | Noted: 2021-07-22

## 2021-07-22 PROBLEM — H66.90 RECURRENT OTITIS MEDIA: Status: ACTIVE | Noted: 2021-07-22

## 2021-07-22 PROBLEM — E78.5 HYPERLIPIDEMIA: Status: ACTIVE | Noted: 2020-01-29

## 2021-07-22 PROCEDURE — 99214 OFFICE O/P EST MOD 30 MIN: CPT | Performed by: NURSE PRACTITIONER

## 2021-07-22 RX ORDER — ATORVASTATIN CALCIUM 20 MG/1
20 TABLET, FILM COATED ORAL DAILY
Qty: 30 TABLET | Refills: 2 | Status: SHIPPED | OUTPATIENT
Start: 2021-07-22 | End: 2021-11-01 | Stop reason: SDUPTHER

## 2021-07-22 RX ORDER — ESCITALOPRAM OXALATE 10 MG/1
10 TABLET ORAL DAILY
Qty: 30 TABLET | Refills: 1 | Status: SHIPPED | OUTPATIENT
Start: 2021-07-22 | End: 2021-10-02 | Stop reason: SDUPTHER

## 2021-07-22 RX ORDER — BUSPIRONE HYDROCHLORIDE 5 MG/1
5 TABLET ORAL 3 TIMES DAILY PRN
Qty: 90 TABLET | Refills: 1 | Status: SHIPPED | OUTPATIENT
Start: 2021-07-22 | End: 2023-01-31

## 2021-07-22 RX ORDER — HYDROCHLOROTHIAZIDE 25 MG/1
25 TABLET ORAL DAILY
Qty: 90 TABLET | Refills: 1 | OUTPATIENT
Start: 2021-07-22 | End: 2022-03-06

## 2021-07-22 NOTE — PROGRESS NOTES
Chief Complaint  Hypertension (f/u) and Hyperlipidemia (f/u)    Subjective        Fernando Verde presents to [facility]  Had a sudden termination at work.    Anxiety has been elevated because has to deal with situation.  Has a new job and is doing better except for the having situation he is in that may be resolved in the next few weeks.    Working at the hospital and liking the job.  Still has some fleeting thoughts of hurting himself but has no plan.      Hypertension  Pertinent negatives include no chest pain, palpitations or shortness of breath.   Hyperlipidemia  Pertinent negatives include no chest pain or shortness of breath.         Past History:    Medical History: has a past medical history of Allergic conjunctivitis and rhinitis, Arthritis, Asthma, B12 deficiency anemia (01/29/2020), Bilateral sciatica (07/20/2016), Broken bones, Depressive disorder, Essential hypertension (01/29/2020), Forgetfulness, Frequent urination, Hearing loss, Hemorrhoids, HLD (hyperlipidemia) (01/29/2020), HTN (hypertension), Lumbago (07/20/2016), Migraine headache, Night sweats, Otalgia, Recurrent otitis media, SOB (shortness of breath), Spondylolisthesis, lumbar region (07/27/2016), and Tinnitus, left ear.     Surgical History: has a past surgical history that includes Other surgical history (2014, 2015); Colonoscopy (2017); Esophagogastroduodenoscopy (2017); Elbow Arthroplasty (2014, 2016); Closed reduction radial / ulnar shaft fracture (2013); and Hardware Removal (2015).     Family History: family history includes Breast cancer in his maternal grandmother; COPD in his paternal grandfather; Diabetes in his maternal grandmother and paternal grandfather; Heart disease in his paternal grandfather; Leukemia in his maternal aunt; Lung cancer in his maternal grandmother; Mental illness in his mother; Stroke in his maternal grandfather.     Social History: reports that he quit smoking about 4 years  "ago. His smoking use included cigarettes. He has never used smokeless tobacco. He reports current alcohol use. He reports that he does not use drugs.    Allergies: Lisinopril          Current Outpatient Medications:   •  atorvastatin (LIPITOR) 20 MG tablet, Take 1 tablet by mouth Daily., Disp: 30 tablet, Rfl: 2  •  hydroCHLOROthiazide (HYDRODIURIL) 25 MG tablet, Take 1 tablet by mouth Daily., Disp: 90 tablet, Rfl: 1  •  ibuprofen (ADVIL,MOTRIN) 800 MG tablet, Take 1 tablet by mouth 3 (Three) Times a Day., Disp: 270 tablet, Rfl: 0  •  busPIRone (BUSPAR) 5 MG tablet, Take 1 tablet by mouth 3 (Three) Times a Day As Needed (anxiety)., Disp: 90 tablet, Rfl: 1  •  escitalopram (Lexapro) 10 MG tablet, Take 1 tablet by mouth Daily., Disp: 30 tablet, Rfl: 1    Medications Discontinued During This Encounter   Medication Reason   • atorvastatin (LIPITOR) 20 MG tablet Reorder   • hydroCHLOROthiazide (HYDRODIURIL) 25 MG tablet Reorder         Review of Systems   Constitutional: Negative for fever.   Respiratory: Negative for cough and shortness of breath.    Cardiovascular: Negative for chest pain, palpitations and leg swelling.   Neurological: Negative for dizziness, weakness, numbness and headache.        Objective         Vitals:    07/22/21 1020   BP: 118/86   BP Location: Right arm   Patient Position: Sitting   Cuff Size: Large Adult   Pulse: 78   Resp: 16   Temp: 97.1 °F (36.2 °C)   TempSrc: Infrared   SpO2: 96%   Weight: 126 kg (277 lb)   Height: 182.9 cm (72\")     Body mass index is 37.57 kg/m².         Physical Exam  Vitals reviewed.   Constitutional:       Appearance: Normal appearance. He is well-developed.   HENT:      Head: Normocephalic and atraumatic.      Mouth/Throat:      Pharynx: No oropharyngeal exudate.   Eyes:      Conjunctiva/sclera: Conjunctivae normal.      Pupils: Pupils are equal, round, and reactive to light.   Cardiovascular:      Rate and Rhythm: Normal rate and regular rhythm.      Heart sounds: No " murmur heard.   No friction rub. No gallop.    Pulmonary:      Effort: Pulmonary effort is normal.      Breath sounds: Normal breath sounds. No wheezing or rhonchi.   Skin:     General: Skin is warm and dry.   Neurological:      Mental Status: He is alert and oriented to person, place, and time.   Psychiatric:         Mood and Affect: Mood and affect normal.         Behavior: Behavior normal.         Thought Content: Thought content normal.         Judgment: Judgment normal.             Result Review :               Assessment and Plan     Diagnoses and all orders for this visit:    1. Situational anxiety (Primary)  -     escitalopram (Lexapro) 10 MG tablet; Take 1 tablet by mouth Daily.  Dispense: 30 tablet; Refill: 1  -     busPIRone (BUSPAR) 5 MG tablet; Take 1 tablet by mouth 3 (Three) Times a Day As Needed (anxiety).  Dispense: 90 tablet; Refill: 1    2. Essential hypertension  Comments:  well controlled on current dose and will continue current dose.    Orders:  -     Lipid Panel w/ Chol/HDL Ratio; Future  -     Comprehensive metabolic panel; Future  -     Urinalysis With Culture If Indicated -; Future  -     CBC No Differential; Future    3. Mixed hyperlipidemia  Comments:  well controlled on medication and will conintue  Orders:  -     atorvastatin (LIPITOR) 20 MG tablet; Take 1 tablet by mouth Daily.  Dispense: 30 tablet; Refill: 2    4. Hypertension, unspecified type  -     hydroCHLOROthiazide (HYDRODIURIL) 25 MG tablet; Take 1 tablet by mouth Daily.  Dispense: 90 tablet; Refill: 1              Follow Up     Return in about 2 months (around 9/22/2021) for Next scheduled follow up.    Patient was given instructions and counseling regarding his condition or for health maintenance advice. Please see specific information pulled into the AVS if appropriate.

## 2021-07-22 NOTE — PATIENT INSTRUCTIONS
"High Cholesterol    High cholesterol is a condition in which the blood has high levels of a white, waxy substance similar to fat (cholesterol). The liver makes all the cholesterol that the body needs. The human body needs small amounts of cholesterol to help build cells. A person gets extra or excess cholesterol from the food that he or she eats.  The blood carries cholesterol from the liver to the rest of the body. If you have high cholesterol, deposits (plaques) may build up on the walls of your arteries. Arteries are the blood vessels that carry blood away from your heart. These plaques make the arteries narrow and stiff.  Cholesterol plaques increase your risk for heart attack and stroke. Work with your health care provider to keep your cholesterol levels in a healthy range.  What increases the risk?  The following factors may make you more likely to develop this condition:  · Eating foods that are high in animal fat (saturated fat) or cholesterol.  · Being overweight.  · Not getting enough exercise.  · A family history of high cholesterol (familial hypercholesterolemia).  · Use of tobacco products.  · Having diabetes.  What are the signs or symptoms?  There are no symptoms of this condition.  How is this diagnosed?  This condition may be diagnosed based on the results of a blood test.  · If you are older than 20 years of age, your health care provider may check your cholesterol levels every 4-6 years.  · You may be checked more often if you have high cholesterol or other risk factors for heart disease.  The blood test for cholesterol measures:  · \"Bad\" cholesterol, or LDL cholesterol. This is the main type of cholesterol that causes heart disease. The desired level is less than 100 mg/dL.  · \"Good\" cholesterol, or HDL cholesterol. HDL helps protect against heart disease by cleaning the arteries and carrying the LDL to the liver for processing. The desired level for HDL is 60 mg/dL or higher.  · Triglycerides. " These are fats that your body can store or burn for energy. The desired level is less than 150 mg/dL.  · Total cholesterol. This measures the total amount of cholesterol in your blood and includes LDL, HDL, and triglycerides. The desired level is less than 200 mg/dL.  How is this treated?  This condition may be treated with:  · Diet changes. You may be asked to eat foods that have more fiber and less saturated fats or added sugar.  · Lifestyle changes. These may include regular exercise, maintaining a healthy weight, and quitting use of tobacco products.  · Medicines. These are given when diet and lifestyle changes have not worked. You may be prescribed a statin medicine to help lower your cholesterol levels.  Follow these instructions at home:  Eating and drinking    · Eat a healthy, balanced diet. This diet includes:  ? Daily servings of a variety of fresh, frozen, or canned fruits and vegetables.  ? Daily servings of whole grain foods that are rich in fiber.  ? Foods that are low in saturated fats and trans fats. These include poultry and fish without skin, lean cuts of meat, and low-fat dairy products.  ? A variety of fish, especially oily fish that contain omega-3 fatty acids. Aim to eat fish at least 2 times a week.  · Avoid foods and drinks that have added sugar.  · Use healthy cooking methods, such as roasting, grilling, broiling, baking, poaching, steaming, and stir-frying. Do not march your food except for stir-frying.  Lifestyle    · Get regular exercise. Aim to exercise for a total of 150 minutes a week. Increase your activity level by doing activities such as gardening, walking, and taking the stairs.  · Do not use any products that contain nicotine or tobacco, such as cigarettes, e-cigarettes, and chewing tobacco. If you need help quitting, ask your health care provider.  General instructions  · Take over-the-counter and prescription medicines only as told by your health care provider.  · Keep all  "follow-up visits as told by your health care provider. This is important.  Where to find more information  · American Heart Association: www.heart.org  · National Heart, Lung, and [employer] [place]: www.nhlbi.nih.gov  Contact a health care provider if:  · You have trouble achieving or maintaining a healthy diet or weight.  · You are starting an exercise program.  · You are unable to stop smoking.  Get help right away if:  · You have chest pain.  · You have trouble breathing.  · You have any symptoms of a stroke. \"BE FAST\" is an easy way to remember the main warning signs of a stroke:  ? B - Balance. Signs are dizziness, sudden trouble walking, or loss of balance.  ? E - Eyes. Signs are trouble seeing or a sudden change in vision.  ? F - Face. Signs are sudden weakness or numbness of the face, or the face or eyelid drooping on one side.  ? A - Arms. Signs are weakness or numbness in an arm. This happens suddenly and usually on one side of the body.  ? S - Speech. Signs are sudden trouble speaking, slurred speech, or trouble understanding what people say.  ? T - Time. Time to call emergency services. Write down what time symptoms started.  · You have other signs of a stroke, such as:  ? A sudden, severe headache with no known cause.  ? Nausea or vomiting.  ? Seizure.  These symptoms may represent a serious problem that is an emergency. Do not wait to see if the symptoms will go away. Get medical help right away. Call your local emergency services (911 in the [place] Do not drive yourself to the hospital.  Summary  · Cholesterol plaques increase your risk for heart attack and stroke. Work with your health care provider to keep your cholesterol levels in a healthy range.  · Eat a healthy, balanced diet, get regular exercise, and maintain a healthy weight.  · Do not use any products that contain nicotine or tobacco, such as cigarettes, e-cigarettes, and chewing tobacco.  · Get help right away if you have any symptoms of a " stroke.  This information is not intended to replace advice given to you by your health care provider. Make sure you discuss any questions you have with your health care provider.  Document Revised: 11/16/2020 Document Reviewed: 11/16/2020  Elsevier Patient Education © 2021 [employer]

## 2021-09-13 ENCOUNTER — TRANSCRIBE ORDERS (OUTPATIENT)
Dept: OTHER | Facility: HOSPITAL | Age: 41
End: 2021-09-13

## 2021-09-13 ENCOUNTER — LAB (OUTPATIENT)
Dept: LAB | Facility: HOSPITAL | Age: 41
End: 2021-09-13

## 2021-09-13 DIAGNOSIS — Z13.9 ENCOUNTER FOR HEALTH-RELATED SCREENING: ICD-10-CM

## 2021-09-13 DIAGNOSIS — Z13.9 ENCOUNTER FOR HEALTH-RELATED SCREENING: Primary | ICD-10-CM

## 2021-09-13 LAB — HBV SURFACE AB SER RIA-ACNC: REACTIVE

## 2021-09-13 PROCEDURE — 86706 HEP B SURFACE ANTIBODY: CPT

## 2021-09-13 PROCEDURE — 36415 COLL VENOUS BLD VENIPUNCTURE: CPT

## 2021-09-28 ENCOUNTER — TRANSCRIBE ORDERS (OUTPATIENT)
Dept: ONCOLOGY | Facility: HOSPITAL | Age: 41
End: 2021-09-28

## 2021-09-28 ENCOUNTER — LAB (OUTPATIENT)
Dept: ONCOLOGY | Facility: HOSPITAL | Age: 41
End: 2021-09-28

## 2021-09-28 DIAGNOSIS — Z13.9 VISIT FOR SCREENING: Primary | ICD-10-CM

## 2021-09-28 DIAGNOSIS — Z13.9 VISIT FOR SCREENING: ICD-10-CM

## 2021-09-28 LAB
ALBUMIN SERPL-MCNC: 4.3 G/DL (ref 3.5–5.2)
ALBUMIN/GLOB SERPL: 1.5 G/DL
ALP SERPL-CCNC: 97 U/L (ref 39–117)
ALT SERPL W P-5'-P-CCNC: 26 U/L (ref 1–41)
ANION GAP SERPL CALCULATED.3IONS-SCNC: 9.1 MMOL/L (ref 5–15)
AST SERPL-CCNC: 16 U/L (ref 1–40)
BASOPHILS # BLD AUTO: 0.06 10*3/MM3 (ref 0–0.2)
BASOPHILS NFR BLD AUTO: 0.8 % (ref 0–1.5)
BILIRUB SERPL-MCNC: 0.3 MG/DL (ref 0–1.2)
BUN SERPL-MCNC: 16 MG/DL (ref 6–20)
BUN/CREAT SERPL: 20.3 (ref 7–25)
CALCIUM SPEC-SCNC: 9.1 MG/DL (ref 8.6–10.5)
CHLORIDE SERPL-SCNC: 105 MMOL/L (ref 98–107)
CHOLEST SERPL-MCNC: 158 MG/DL (ref 0–200)
CO2 SERPL-SCNC: 22.9 MMOL/L (ref 22–29)
CREAT SERPL-MCNC: 0.79 MG/DL (ref 0.76–1.27)
DEPRECATED RDW RBC AUTO: 42.4 FL (ref 37–54)
EOSINOPHIL # BLD AUTO: 0.08 10*3/MM3 (ref 0–0.4)
EOSINOPHIL NFR BLD AUTO: 1 % (ref 0.3–6.2)
ERYTHROCYTE [DISTWIDTH] IN BLOOD BY AUTOMATED COUNT: 13 % (ref 12.3–15.4)
GFR SERPL CREATININE-BSD FRML MDRD: 108 ML/MIN/1.73
GLOBULIN UR ELPH-MCNC: 2.9 GM/DL
GLUCOSE SERPL-MCNC: 105 MG/DL (ref 65–99)
HBA1C MFR BLD: 6.13 % (ref 4.8–5.6)
HCT VFR BLD AUTO: 46.2 % (ref 37.5–51)
HDLC SERPL-MCNC: 41 MG/DL (ref 40–60)
HGB BLD-MCNC: 15.1 G/DL (ref 13–17.7)
IMM GRANULOCYTES # BLD AUTO: 0.03 10*3/MM3 (ref 0–0.05)
IMM GRANULOCYTES NFR BLD AUTO: 0.4 % (ref 0–0.5)
LDLC SERPL CALC-MCNC: 94 MG/DL (ref 0–100)
LDLC/HDLC SERPL: 2.22 {RATIO}
LYMPHOCYTES # BLD AUTO: 2.65 10*3/MM3 (ref 0.7–3.1)
LYMPHOCYTES NFR BLD AUTO: 33.5 % (ref 19.6–45.3)
MCH RBC QN AUTO: 28.8 PG (ref 26.6–33)
MCHC RBC AUTO-ENTMCNC: 32.7 G/DL (ref 31.5–35.7)
MCV RBC AUTO: 88.2 FL (ref 79–97)
MONOCYTES # BLD AUTO: 0.54 10*3/MM3 (ref 0.1–0.9)
MONOCYTES NFR BLD AUTO: 6.8 % (ref 5–12)
NEUTROPHILS NFR BLD AUTO: 4.55 10*3/MM3 (ref 1.7–7)
NEUTROPHILS NFR BLD AUTO: 57.5 % (ref 42.7–76)
PLATELET # BLD AUTO: 271 10*3/MM3 (ref 140–450)
PMV BLD AUTO: 10.1 FL (ref 6–12)
POTASSIUM SERPL-SCNC: 3.5 MMOL/L (ref 3.5–5.2)
PROT SERPL-MCNC: 7.2 G/DL (ref 6–8.5)
RBC # BLD AUTO: 5.24 10*6/MM3 (ref 4.14–5.8)
SODIUM SERPL-SCNC: 137 MMOL/L (ref 136–145)
TRIGL SERPL-MCNC: 129 MG/DL (ref 0–150)
TSH SERPL DL<=0.05 MIU/L-ACNC: 2.68 UIU/ML (ref 0.27–4.2)
VLDLC SERPL-MCNC: 23 MG/DL (ref 5–40)
WBC # BLD AUTO: 7.91 10*3/MM3 (ref 3.4–10.8)

## 2021-09-28 PROCEDURE — 80053 COMPREHEN METABOLIC PANEL: CPT

## 2021-09-28 PROCEDURE — 80061 LIPID PANEL: CPT

## 2021-09-28 PROCEDURE — 83036 HEMOGLOBIN GLYCOSYLATED A1C: CPT

## 2021-09-28 PROCEDURE — 85025 COMPLETE CBC W/AUTO DIFF WBC: CPT

## 2021-09-28 PROCEDURE — 36415 COLL VENOUS BLD VENIPUNCTURE: CPT

## 2021-09-28 PROCEDURE — 84443 ASSAY THYROID STIM HORMONE: CPT

## 2021-09-28 RX ORDER — IBUPROFEN 800 MG/1
800 TABLET ORAL 3 TIMES DAILY
Qty: 270 TABLET | Refills: 0 | Status: CANCELLED | OUTPATIENT
Start: 2021-09-28

## 2021-09-30 RX ORDER — IBUPROFEN 800 MG/1
800 TABLET ORAL 3 TIMES DAILY
Qty: 270 TABLET | Refills: 1 | Status: SHIPPED | OUTPATIENT
Start: 2021-09-30 | End: 2022-02-10 | Stop reason: SDUPTHER

## 2021-10-02 DIAGNOSIS — F41.8 SITUATIONAL ANXIETY: ICD-10-CM

## 2021-10-04 RX ORDER — ESCITALOPRAM OXALATE 10 MG/1
10 TABLET ORAL DAILY
Qty: 90 TABLET | Refills: 1 | Status: SHIPPED | OUTPATIENT
Start: 2021-10-04 | End: 2022-02-02 | Stop reason: SDUPTHER

## 2021-11-01 DIAGNOSIS — E78.2 MIXED HYPERLIPIDEMIA: ICD-10-CM

## 2021-11-01 RX ORDER — ATORVASTATIN CALCIUM 20 MG/1
20 TABLET, FILM COATED ORAL DAILY
Qty: 30 TABLET | Refills: 2 | Status: SHIPPED | OUTPATIENT
Start: 2021-11-01 | End: 2022-02-10 | Stop reason: SDUPTHER

## 2022-01-20 ENCOUNTER — LAB (OUTPATIENT)
Dept: LAB | Facility: HOSPITAL | Age: 42
End: 2022-01-20

## 2022-01-20 DIAGNOSIS — I10 ESSENTIAL HYPERTENSION: ICD-10-CM

## 2022-01-20 LAB
ALBUMIN SERPL-MCNC: 4.3 G/DL (ref 3.5–5.2)
ALBUMIN/GLOB SERPL: 1.2 G/DL
ALP SERPL-CCNC: 94 U/L (ref 39–117)
ALT SERPL W P-5'-P-CCNC: 35 U/L (ref 1–41)
AMORPH URATE CRY URNS QL MICRO: ABNORMAL /HPF
ANION GAP SERPL CALCULATED.3IONS-SCNC: 10 MMOL/L (ref 5–15)
AST SERPL-CCNC: 29 U/L (ref 1–40)
BACTERIA UR QL AUTO: ABNORMAL /HPF
BILIRUB SERPL-MCNC: 0.4 MG/DL (ref 0–1.2)
BILIRUB UR QL STRIP: NEGATIVE
BUN SERPL-MCNC: 19 MG/DL (ref 6–20)
BUN/CREAT SERPL: 24.7 (ref 7–25)
CALCIUM SPEC-SCNC: 9.5 MG/DL (ref 8.6–10.5)
CHLORIDE SERPL-SCNC: 101 MMOL/L (ref 98–107)
CLARITY UR: ABNORMAL
CO2 SERPL-SCNC: 29 MMOL/L (ref 22–29)
COLOR UR: YELLOW
CREAT SERPL-MCNC: 0.77 MG/DL (ref 0.76–1.27)
DEPRECATED RDW RBC AUTO: 40.6 FL (ref 37–54)
ERYTHROCYTE [DISTWIDTH] IN BLOOD BY AUTOMATED COUNT: 13.1 % (ref 12.3–15.4)
GFR SERPL CREATININE-BSD FRML MDRD: 111 ML/MIN/1.73
GLOBULIN UR ELPH-MCNC: 3.5 GM/DL
GLUCOSE SERPL-MCNC: 97 MG/DL (ref 65–99)
GLUCOSE UR STRIP-MCNC: NEGATIVE MG/DL
HCT VFR BLD AUTO: 47.2 % (ref 37.5–51)
HGB BLD-MCNC: 15.4 G/DL (ref 13–17.7)
HGB UR QL STRIP.AUTO: NEGATIVE
HYALINE CASTS UR QL AUTO: ABNORMAL /LPF
KETONES UR QL STRIP: NEGATIVE
LEUKOCYTE ESTERASE UR QL STRIP.AUTO: NEGATIVE
MCH RBC QN AUTO: 27.8 PG (ref 26.6–33)
MCHC RBC AUTO-ENTMCNC: 32.6 G/DL (ref 31.5–35.7)
MCV RBC AUTO: 85.4 FL (ref 79–97)
NITRITE UR QL STRIP: NEGATIVE
PH UR STRIP.AUTO: 5.5 [PH] (ref 5–8)
PLATELET # BLD AUTO: 295 10*3/MM3 (ref 140–450)
PMV BLD AUTO: 10.9 FL (ref 6–12)
POTASSIUM SERPL-SCNC: 3.7 MMOL/L (ref 3.5–5.2)
PROT SERPL-MCNC: 7.8 G/DL (ref 6–8.5)
PROT UR QL STRIP: ABNORMAL
RBC # BLD AUTO: 5.53 10*6/MM3 (ref 4.14–5.8)
RBC # UR STRIP: ABNORMAL /HPF
REF LAB TEST METHOD: ABNORMAL
SODIUM SERPL-SCNC: 140 MMOL/L (ref 136–145)
SP GR UR STRIP: 1.03 (ref 1–1.03)
SQUAMOUS #/AREA URNS HPF: ABNORMAL /HPF
UROBILINOGEN UR QL STRIP: ABNORMAL
WBC # UR STRIP: ABNORMAL /HPF
WBC NRBC COR # BLD: 8.67 10*3/MM3 (ref 3.4–10.8)

## 2022-01-20 PROCEDURE — 87086 URINE CULTURE/COLONY COUNT: CPT

## 2022-01-20 PROCEDURE — 85027 COMPLETE CBC AUTOMATED: CPT

## 2022-01-20 PROCEDURE — 80053 COMPREHEN METABOLIC PANEL: CPT

## 2022-01-20 PROCEDURE — 36415 COLL VENOUS BLD VENIPUNCTURE: CPT

## 2022-01-20 PROCEDURE — 81001 URINALYSIS AUTO W/SCOPE: CPT

## 2022-01-22 LAB — BACTERIA SPEC AEROBE CULT: NO GROWTH

## 2022-01-24 ENCOUNTER — PATIENT MESSAGE (OUTPATIENT)
Dept: FAMILY MEDICINE CLINIC | Facility: CLINIC | Age: 42
End: 2022-01-24

## 2022-01-24 NOTE — TELEPHONE ENCOUNTER
From: Fernando Verde  To: DEVIN Kraft  Sent: 1/24/2022 7:03 AM EST  Subject: Question regarding URINALYSIS, MICROSCOPIC ONLY    What about the rest of the urinalysis results? The ones that say there was an issue

## 2022-01-26 ENCOUNTER — TRANSCRIBE ORDERS (OUTPATIENT)
Dept: LAB | Facility: HOSPITAL | Age: 42
End: 2022-01-26

## 2022-01-26 ENCOUNTER — LAB (OUTPATIENT)
Dept: LAB | Facility: HOSPITAL | Age: 42
End: 2022-01-26

## 2022-01-26 DIAGNOSIS — Z01.818 PRE-OP TESTING: Primary | ICD-10-CM

## 2022-01-26 DIAGNOSIS — Z01.818 PRE-OP TESTING: ICD-10-CM

## 2022-01-26 LAB — SARS-COV-2 RNA PNL SPEC NAA+PROBE: NOT DETECTED

## 2022-01-26 PROCEDURE — U0004 COV-19 TEST NON-CDC HGH THRU: HCPCS

## 2022-01-26 PROCEDURE — C9803 HOPD COVID-19 SPEC COLLECT: HCPCS

## 2022-02-02 ENCOUNTER — HOSPITAL ENCOUNTER (OUTPATIENT)
Dept: GENERAL RADIOLOGY | Facility: HOSPITAL | Age: 42
Discharge: HOME | End: 2022-02-02
Admitting: NURSE PRACTITIONER

## 2022-02-02 ENCOUNTER — OFFICE VISIT (OUTPATIENT)
Dept: FAMILY MEDICINE CLINIC | Facility: CLINIC | Age: 42
End: 2022-02-02

## 2022-02-02 VITALS
TEMPERATURE: 97.5 F | BODY MASS INDEX: 37.55 KG/M2 | HEART RATE: 96 BPM | OXYGEN SATURATION: 99 % | HEIGHT: 72 IN | SYSTOLIC BLOOD PRESSURE: 142 MMHG | DIASTOLIC BLOOD PRESSURE: 89 MMHG | WEIGHT: 277.2 LBS

## 2022-02-02 DIAGNOSIS — R53.83 FATIGUE, UNSPECIFIED TYPE: ICD-10-CM

## 2022-02-02 DIAGNOSIS — R20.2 NUMBNESS AND TINGLING IN LEFT ARM: ICD-10-CM

## 2022-02-02 DIAGNOSIS — R06.83 SNORING: Primary | ICD-10-CM

## 2022-02-02 DIAGNOSIS — F41.8 SITUATIONAL ANXIETY: ICD-10-CM

## 2022-02-02 DIAGNOSIS — R20.0 NUMBNESS AND TINGLING IN LEFT ARM: ICD-10-CM

## 2022-02-02 DIAGNOSIS — I10 PRIMARY HYPERTENSION: ICD-10-CM

## 2022-02-02 PROCEDURE — 99214 OFFICE O/P EST MOD 30 MIN: CPT | Performed by: NURSE PRACTITIONER

## 2022-02-02 PROCEDURE — 72050 X-RAY EXAM NECK SPINE 4/5VWS: CPT

## 2022-02-02 RX ORDER — ESCITALOPRAM OXALATE 20 MG/1
20 TABLET ORAL DAILY
Qty: 90 TABLET | Refills: 1 | Status: SHIPPED | OUTPATIENT
Start: 2022-02-02 | End: 2022-07-25 | Stop reason: SDUPTHER

## 2022-02-02 RX ORDER — LOSARTAN POTASSIUM 25 MG/1
25 TABLET ORAL DAILY
Qty: 90 TABLET | Refills: 1 | Status: SHIPPED | OUTPATIENT
Start: 2022-02-02 | End: 2022-03-28 | Stop reason: SDUPTHER

## 2022-02-02 NOTE — PROGRESS NOTES
"Chief Complaint  Insomnia and Hypertension    Subjective          Fernando Verde presents to [facility]  Patient presents to the office today requesting a referral to the sleep clinic. He does have a history of snoring and sleep apnea. He states that approximately 4 years ago he was diagnosed with apnea but has not been utilizing CPAP. He states that he does continue to have fatigue. Patient also states that his blood pressure has been running staying elevated. Blood pressure on arrival today is 142/89. He denies any chest pain shortness breath palpitations this time. He denies any headaches at this time.    Patient does state that he is on Lexapro 10 mg daily. He states that it works somewhat but is not as effective as he is helping. We did discuss increasing this to 20 mg. He also states that he has been having left arm paresthesias. He states that his wife has noticed him in his hand due to the numbness and tingling but he feels as if this is just become a habit to him because he is used to it. He denies any arm pain, arm swelling. Does state he has a history of a neck injury.      Objective   Vital Signs:   /89 (BP Location: Right arm, Patient Position: Sitting, Cuff Size: Adult)   Pulse 96   Temp 97.5 °F (36.4 °C) (Temporal)   Ht 182.9 cm (72.01\")   Wt 126 kg (277 lb 3.2 oz)   SpO2 99%   BMI 37.59 kg/m²     Physical Exam  Vitals reviewed.   Constitutional:       Appearance: Normal appearance.   Cardiovascular:      Rate and Rhythm: Normal rate and regular rhythm.      Heart sounds: Normal heart sounds, S1 normal and S2 normal. No murmur heard.      Pulmonary:      Effort: Pulmonary effort is normal. No respiratory distress.      Breath sounds: Normal breath sounds.   Skin:     General: Skin is warm and dry.   Neurological:      Mental Status: He is alert and oriented to person, place, and time.   Psychiatric:         Attention and Perception: Attention normal.  "        Mood and Affect: Mood normal.         Behavior: Behavior normal.        Result Review :                 Assessment and Plan    Diagnoses and all orders for this visit:    1. Snoring (Primary)  -     Ambulatory Referral to Sleep Medicine    2. Fatigue, unspecified type  -     Ambulatory Referral to Sleep Medicine    3. Primary hypertension  -     losartan (Cozaar) 25 MG tablet; Take 1 tablet by mouth Daily.  Dispense: 90 tablet; Refill: 1    4. Situational anxiety  -     escitalopram (Lexapro) 20 MG tablet; Take 1 tablet by mouth Daily.  Dispense: 90 tablet; Refill: 1    5. Numbness and tingling in left arm  -     XR Spine Cervical Complete 4 or 5 View; Future        Follow Up   Return in about 6 months (around 8/2/2022) for Recheck.  Patient was given instructions and counseling regarding his condition or for health maintenance advice. Please see specific information pulled into the AVS if appropriate.

## 2022-02-07 ENCOUNTER — TELEPHONE (OUTPATIENT)
Dept: FAMILY MEDICINE CLINIC | Facility: CLINIC | Age: 42
End: 2022-02-07

## 2022-02-07 NOTE — TELEPHONE ENCOUNTER
----- Message from DEVIN Weaver sent at 2/7/2022  1:00 PM EST -----  Anti-inflammatories would be the initial thing we will do on a  routine basis to see if this improves the symptoms.  ----- Message -----  From: Janeen Gurrola  Sent: 2/7/2022   8:41 AM EST  To: DEVIN Weaver    Called pt, results given. He wants to know what he can do to help this or if there is anything that can improve.

## 2022-02-09 ENCOUNTER — PATIENT MESSAGE (OUTPATIENT)
Dept: FAMILY MEDICINE CLINIC | Facility: CLINIC | Age: 42
End: 2022-02-09

## 2022-02-09 DIAGNOSIS — E78.2 MIXED HYPERLIPIDEMIA: ICD-10-CM

## 2022-02-10 RX ORDER — IBUPROFEN 800 MG/1
800 TABLET ORAL 3 TIMES DAILY
Qty: 270 TABLET | Refills: 1 | Status: SHIPPED | OUTPATIENT
Start: 2022-02-10 | End: 2022-08-02 | Stop reason: SDUPTHER

## 2022-02-10 RX ORDER — ATORVASTATIN CALCIUM 20 MG/1
20 TABLET, FILM COATED ORAL DAILY
Qty: 30 TABLET | Refills: 2 | Status: SHIPPED | OUTPATIENT
Start: 2022-02-10 | End: 2022-05-30 | Stop reason: SDUPTHER

## 2022-02-10 NOTE — TELEPHONE ENCOUNTER
From: Fernando Verde  To: DEVIN Weaver  Sent: 2/9/2022 11:43 PM EST  Subject: Prescription Refills    I have a few prescriptions that are close to being out of refills. They were last filled by Ms Padron. Could you send in refills? It would be for the Ibuprofen 800 mg and Atorvastatin 20 mg. You upped my Lexapro and we changed my BP med.

## 2022-03-15 ENCOUNTER — OFFICE VISIT (OUTPATIENT)
Dept: SLEEP MEDICINE | Facility: HOSPITAL | Age: 42
End: 2022-03-15

## 2022-03-15 VITALS
WEIGHT: 276 LBS | BODY MASS INDEX: 36.58 KG/M2 | OXYGEN SATURATION: 97 % | SYSTOLIC BLOOD PRESSURE: 149 MMHG | TEMPERATURE: 97.8 F | DIASTOLIC BLOOD PRESSURE: 99 MMHG | HEIGHT: 73 IN | HEART RATE: 84 BPM

## 2022-03-15 DIAGNOSIS — G47.33 OSA (OBSTRUCTIVE SLEEP APNEA): Primary | ICD-10-CM

## 2022-03-15 PROCEDURE — G0463 HOSPITAL OUTPT CLINIC VISIT: HCPCS | Performed by: PSYCHIATRY & NEUROLOGY

## 2022-03-15 NOTE — PROGRESS NOTES
[facility]    SLEEP CLINIC FOLLOW UP PROGRESS NOTE.    Fernando Verde  1980  41 y.o.  male      PCP: Luis Armando Padron APRN      Date of visit: 3/15/2022    The patient is returning for follow-up visit.  Reason for follow-up visit: Snoring, frequently waking up and headaches.    HPI:  This is a 41 y.o. years old patient who has a history of obstructive sleep apnea.  Review of previous medical records shows that he was initially seen at the  [facility] because of complaints of difficulty initiating sleep, fatigue, low energy and  snoring.  A sleep study done on 12/4/2017 showed an AHI of 11.8/h total sleep time, 20.5/h and REM sleep and a total RDI of 14.4/h.  He was started on CPAP however, was noncompliant as he had difficulty tolerating the CPAP machine and returned  his equipment.  He tells me today that he had difficulty with being comfortable with his mask.  He returned on 2/4/2020 pretty much with similar complaints of snoring and insomnia.  Repeat sleep study was suggested at that time however, he did not follow through.  He is back here today with reports that his wife is complaining that his snoring has gotten worse.  He remains tired and sleepy when he wakes up.  He gets approximately 6 to 8 hours of sleep during the night.  He continues to have difficulty staying asleep at night.  He wakes up with a headache.  He does not usually take naps during the day.  His Kirklin score is 11/24.    Normal bedtime on weekdays is around 11 PM and on weekends is 12 midnight to 1 AM.  He is not really sure how long it takes him to go to sleep.  He will awaken frequently while asleep for no specific reason, at times to urinate.  He denies any specific parasomnias.  He has gained approximately 7 pounds since his last follow-up visit.  He denies any recent injuries to the nose or surgeries to the nose and throat area.    Mediactions and allergies are reviewed by me and documented in the  "encounter.    Interval past medical/surgical history: Noncontributory.  Patient has a history of hypertension.    SOCIAL ( habits pertaining to sleep medicine)  History of smoking:Yes (previous smoker)  History of alcohol use: 0 per week  Caffeine use: 4-5 caffeinated beverages    REVIEW OF SYSTEMS:   [place] Sleepiness Scale :Total score: 11   Nsaal congestion: Yes  Dry mouth/nose: No  Post nasal drip; no  Acid reflux/Heartburn: No  Abd bloating: No  Morining headache: Yes  Anxiety: Yes  Depression: Yes  Patient complains of fatigue, frequent urination.    Physical Exam :  CONSTITUTIONAL:  Vitals:    03/15/22 1300   BP: 149/99   Pulse: 84   Temp: 97.8 °F (36.6 °C)   SpO2: 97%   Weight: 125 kg (276 lb)   Height: 185.4 cm (73\")    Body mass index is 36.41 kg/m².   Neck: No masses noted.  No carotid bruits.  ENT Mallampati class III  RESP SYSTEM: Breath sounds are normal, no wheezes or crackles  CARDIOVASULAR: Heart rate is regular without murmur.  Neuropsych: Is awake alert and oriented.  Responses are coherent and relevant.  Mood and affect appeared appropriate     ASSESSMENT AND PLAN:  · Obstructive Sleep Apnea, currently untreated  · Insomnia  ·   · The patient was told that if he wants to go back on CPAP treatment, he will probably need another sleep study.  He was also told that he could be tried on different masks.  If nasal pillows are more comfortable for him (which he has not tried), he could probably use this with a chinstrap.  · Oral appliance therapy.  Patient reports that he would like to pursue this.  He was asked to check with his dentist to see whether he can make a mouthpiece.  He was also given names of other dentists that do make oral appliance devices for treatment of MONE.  · He was advised to let us know about progress in his treatment.  He was told that if he does end up with a mouthpiece, that he will probably need another sleep study with the mouthpiece to assess efficacy of treatment.  · At " this time, because of weight guidelines, he probably does not qualify for hypoglossal nerve stimulator.  · Return if symptoms worsen or fail to improve. . Patient's questions were answered.      Amy Olea MD  3/15/2022

## 2022-03-28 DIAGNOSIS — I10 PRIMARY HYPERTENSION: ICD-10-CM

## 2022-03-28 RX ORDER — LOSARTAN POTASSIUM 100 MG/1
100 TABLET ORAL DAILY
Qty: 90 TABLET | Refills: 1 | Status: SHIPPED | OUTPATIENT
Start: 2022-03-28 | End: 2022-08-02 | Stop reason: SDUPTHER

## 2022-04-20 ENCOUNTER — OFFICE VISIT (OUTPATIENT)
Dept: FAMILY MEDICINE CLINIC | Facility: CLINIC | Age: 42
End: 2022-04-20

## 2022-04-20 VITALS
WEIGHT: 276 LBS | HEIGHT: 73 IN | SYSTOLIC BLOOD PRESSURE: 130 MMHG | DIASTOLIC BLOOD PRESSURE: 92 MMHG | BODY MASS INDEX: 36.58 KG/M2

## 2022-04-20 DIAGNOSIS — J30.1 SEASONAL ALLERGIC RHINITIS DUE TO POLLEN: ICD-10-CM

## 2022-04-20 DIAGNOSIS — R22.1 SWOLLEN THROAT: Primary | ICD-10-CM

## 2022-04-20 DIAGNOSIS — K21.00 GASTROESOPHAGEAL REFLUX DISEASE WITH ESOPHAGITIS WITHOUT HEMORRHAGE: ICD-10-CM

## 2022-04-20 LAB
EXPIRATION DATE: NORMAL
INTERNAL CONTROL: NORMAL
Lab: NORMAL
S PYO AG THROAT QL: NEGATIVE

## 2022-04-20 PROCEDURE — 87880 STREP A ASSAY W/OPTIC: CPT

## 2022-04-20 PROCEDURE — 99213 OFFICE O/P EST LOW 20 MIN: CPT

## 2022-04-20 RX ORDER — FAMOTIDINE 40 MG/1
40 TABLET, FILM COATED ORAL DAILY
Qty: 30 TABLET | Refills: 0 | Status: SHIPPED | OUTPATIENT
Start: 2022-04-20

## 2022-04-20 NOTE — PROGRESS NOTES
"Fernando Verde presents to [facility] with complaints of swollen throat and sore throat.      History of Present Illness  This is a 41-year-old male who presents to clinic with complaints of a swollen throat and a sore throat.    Patient states that he has been to the urgent care a few times in the past month due to sinus infections, was originally treated with just some decongestant, then the next time that he went he was treated with an antibiotic and some Tessalon Perles.  Patient states that his other symptoms of sinus issues has completely resolved, but states that he woke up this morning and he noticed that his throat was pretty swollen.  Patient states it does not really hurt until he goes to swallow, but does feel like it is a little bit tighter in the back part of his throat.  Patient has not taken any new medications recently, besides the antibiotic that he was prescribed in the middle of March.  Patient also states that it is a little bit sore, but he denies any other symptoms.  Patient was treated with Augmentin at prior urgent care visit, but states he is concerned it could be strep throat.  Patient denies any other symptoms, no fever/chills/body aches.  No shortness of breath/chest congestion/chest pain.  No wheezing.  Patient also states that he does not have any history of acid reflux, states that he will occasionally get heartburn, but typically this resolves pretty quickly.  Patient states he denies any gas/abdominal pain/nausea.    The following portions of the patient's history were personally reviewed and updated as appropriate: allergies, current medications, past medical history, past surgical history, past family history, and past social history.       Objective   Vital Signs:   /92   Ht 185.4 cm (73\")   Wt 125 kg (276 lb)   BMI 36.41 kg/m²     Body mass index is 36.41 kg/m².    All labs, imaging, test results, and specialty provider notes " reviewed with patient.     Physical Exam  Vitals reviewed.   Constitutional:       Appearance: Normal appearance.   HENT:      Mouth/Throat:      Pharynx: Posterior oropharyngeal erythema and uvula swelling present.   Cardiovascular:      Rate and Rhythm: Normal rate and regular rhythm.      Pulses: Normal pulses.      Heart sounds: Normal heart sounds.   Pulmonary:      Effort: Pulmonary effort is normal.      Breath sounds: Normal breath sounds.   Neurological:      General: No focal deficit present.      Mental Status: He is alert and oriented to person, place, and time.          Assessment and Plan:  Diagnoses and all orders for this visit:    1. Swollen throat (Primary)  -     POCT rapid strep A  -     famotidine (Pepcid) 40 MG tablet; Take 1 tablet by mouth Daily.  Dispense: 30 tablet; Refill: 0    2. Seasonal allergic rhinitis due to pollen  -     famotidine (Pepcid) 40 MG tablet; Take 1 tablet by mouth Daily.  Dispense: 30 tablet; Refill: 0    3. Gastroesophageal reflux disease with esophagitis without hemorrhage  -     famotidine (Pepcid) 40 MG tablet; Take 1 tablet by mouth Daily.  Dispense: 30 tablet; Refill: 0      It appears patient is still suffering from some seasonal allergies, did instruct him to start taking a daily Zyrtec as well as start using Flonase regularly.  We will also give him some Pepcid to treat any possible acid reflux/also will have some added benefit as a histamine blocker.  Did instruct patient with strict ER precautions, he verbalized understanding of this.    Follow Up:  No follow-ups on file.    Patient was given instructions and counseling regarding his condition or for health maintenance advice. Please see specific information pulled into the AVS if appropriate.

## 2022-05-30 DIAGNOSIS — E78.2 MIXED HYPERLIPIDEMIA: ICD-10-CM

## 2022-05-31 RX ORDER — ATORVASTATIN CALCIUM 20 MG/1
20 TABLET, FILM COATED ORAL DAILY
Qty: 30 TABLET | Refills: 2 | Status: SHIPPED | OUTPATIENT
Start: 2022-05-31 | End: 2022-08-02 | Stop reason: SDUPTHER

## 2022-06-03 ENCOUNTER — APPOINTMENT (OUTPATIENT)
Dept: GENERAL RADIOLOGY | Facility: HOSPITAL | Age: 42
End: 2022-06-03

## 2022-06-03 PROCEDURE — 73090 X-RAY EXAM OF FOREARM: CPT

## 2022-06-03 PROCEDURE — 73130 X-RAY EXAM OF HAND: CPT

## 2022-06-04 ENCOUNTER — HOSPITAL ENCOUNTER (EMERGENCY)
Facility: HOSPITAL | Age: 42
Discharge: HOME | End: 2022-06-04
Attending: EMERGENCY MEDICINE | Admitting: EMERGENCY MEDICINE

## 2022-06-04 VITALS
SYSTOLIC BLOOD PRESSURE: 140 MMHG | HEART RATE: 93 BPM | WEIGHT: 276.9 LBS | OXYGEN SATURATION: 97 % | RESPIRATION RATE: 20 BRPM | BODY MASS INDEX: 37.5 KG/M2 | HEIGHT: 72 IN | DIASTOLIC BLOOD PRESSURE: 99 MMHG | TEMPERATURE: 98.9 F

## 2022-06-04 DIAGNOSIS — S51.811A LACERATION OF SKIN OF RIGHT FOREARM, INITIAL ENCOUNTER: ICD-10-CM

## 2022-06-04 DIAGNOSIS — S51.851A DOG BITE OF RIGHT FOREARM, INITIAL ENCOUNTER: Primary | ICD-10-CM
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
## 2022-06-04 PROCEDURE — 99282 EMERGENCY DEPT VISIT SF MDM: CPT

## 2022-06-04 RX ORDER — GINSENG 100 MG
1 CAPSULE ORAL ONCE
Status: COMPLETED | OUTPATIENT
Start: 2022-06-04 | End: 2022-06-04

## 2022-06-04 RX ORDER — AMOXICILLIN AND CLAVULANATE POTASSIUM 875; 125 MG/1; MG/1
1 TABLET, FILM COATED ORAL 2 TIMES DAILY
Qty: 14 TABLET | Refills: 0 | Status: SHIPPED | OUTPATIENT
Start: 2022-06-04 | End: 2022-06-12

## 2022-06-04 RX ORDER — LIDOCAINE HYDROCHLORIDE AND EPINEPHRINE 10; 10 MG/ML; UG/ML
10 INJECTION, SOLUTION INFILTRATION; PERINEURAL ONCE
Status: COMPLETED | OUTPATIENT
Start: 2022-06-04 | End: 2022-06-04

## 2022-06-04 RX ADMIN — Medication 1 APPLICATION: at 02:22

## 2022-06-04 RX ADMIN — LIDOCAINE HYDROCHLORIDE AND EPINEPHRINE 10 ML: 10; 10 INJECTION, SOLUTION INFILTRATION; PERINEURAL at 01:54

## 2022-06-04 NOTE — ED PROVIDER NOTES
Subjective   Pt reports his neighbors dog bit him when he was walking to the [employer] truck. C/o laceration to right forearm and pain in right knuckles of hand where bruising is.       History provided by:  Patient  Animal Bite  Contact animal:  Dog  Location:  Hand and shoulder/arm  Shoulder/arm injury location:  R forearm  Hand injury location:  [place] of R hand  Time since incident:  1 hour  Pain details:     Quality:  Sore and sharp    Severity:  Moderate    Timing:  Constant    Progression:  Unchanged  Incident location:  Outside  Provoked: unprovoked    Notifications:  Animal control  Animal's rabies vaccination status:  Up to date  Animal in possession: yes    Tetanus status:  Up to date  Relieved by:  Nothing  Worsened by:  Nothing  Ineffective treatments:  None tried  Associated symptoms: swelling    Associated symptoms: no fever, no numbness and no rash        Review of Systems   Constitutional: Negative for chills and fever.   HENT: Negative for congestion, ear pain and sore throat.    Eyes: Negative for pain.   Respiratory: Negative for cough, chest tightness and shortness of breath.    Cardiovascular: Negative for chest pain.   Gastrointestinal: Negative for abdominal pain, diarrhea, nausea and vomiting.   Genitourinary: Negative for flank pain and hematuria.   Musculoskeletal: Negative for joint swelling.   Skin: Positive for wound. Negative for pallor and rash.   Neurological: Negative for seizures, numbness and headaches.   All other systems reviewed and are negative.      Past Medical History:   Diagnosis Date   • Allergic conjunctivitis and rhinitis    • Arthritis    • Asthma    • B12 deficiency anemia 01/29/2020   • Bilateral sciatica 07/20/2016   • Broken bones    • Depressive disorder    • Essential hypertension 01/29/2020   • Forgetfulness    • Frequent urination    • Hearing loss    • Hemorrhoids    • HLD (hyperlipidemia) 01/29/2020   • HTN (hypertension)    • Lumbago 07/20/2016    with low back  pain   • Migraine headache    • Night sweats    • Otalgia    • Recurrent otitis media    • SOB (shortness of breath)    • Spondylolisthesis, lumbar region 2016   • Tinnitus, left ear        Allergies   Allergen Reactions   • Lisinopril Cough       Past Surgical History:   Procedure Laterality Date   • CLOSED REDUCTION RADIAL / ULNAR SHAFT FRACTURE     • COLONOSCOPY  2017   • ELBOW ARTHROPLASTY  ,    • ENDOSCOPY     • HARDWARE REMOVAL     • OTHER SURGICAL HISTORY  ,     Arm Surgery       Family History   Problem Relation Age of Onset   • Mental illness Mother         Pschiatric disorder   • Breast cancer Maternal Grandmother    • Lung cancer Maternal Grandmother    • Diabetes Maternal Grandmother    • Stroke Maternal Grandfather    • Heart disease Paternal Grandfather    • Diabetes Paternal Grandfather    • COPD Paternal Grandfather    • Leukemia Maternal Aunt        Social History     Socioeconomic History   • Marital status:    Tobacco Use   • Smoking status: Former Smoker     Packs/day: 0.50     Years: 15.00     Pack years: 7.50     Types: Cigarettes     Quit date: 2017     Years since quittin.4   • Smokeless tobacco: Never Used   Vaping Use   • Vaping Use: Never used   Substance and Sexual Activity   • Alcohol use: Yes     Comment: occasionally   • Drug use: Never   • Sexual activity: Yes     Partners: Female     Birth control/protection: None           Objective   Physical Exam  Vitals and nursing note reviewed.   Constitutional:       General: He is not in acute distress.     Appearance: Normal appearance. He is not toxic-appearing.   HENT:      Head: Normocephalic and atraumatic.      Mouth/Throat:      Mouth: Mucous membranes are moist.   Eyes:      General: No scleral icterus.  Cardiovascular:      Rate and Rhythm: Normal rate and regular rhythm.      Pulses: Normal pulses.      Heart sounds: Normal heart sounds.   Pulmonary:      Effort: Pulmonary effort is  normal. No respiratory distress.      Breath sounds: Normal breath sounds.   Abdominal:      General: Abdomen is flat.      Palpations: Abdomen is soft.      Tenderness: There is no abdominal tenderness.   Musculoskeletal:         General: Normal range of motion.      Right forearm: Laceration present.      Right wrist: Normal pulse.      Right hand: Swelling, tenderness and bony tenderness present. Normal sensation. There is no disruption of two-point discrimination. Normal capillary refill. Normal pulse.        Arms:         Hands:       Cervical back: Normal range of motion and neck supple.   Skin:     General: Skin is warm and dry.   Neurological:      Mental Status: He is alert and oriented to person, place, and time. Mental status is at baseline.         Laceration Repair    Date/Time: 6/4/2022 2:08 AM  Performed by: Mario Villalta APRN  Authorized by: Alana Hubbard MD     Consent:     Consent obtained:  Verbal    Consent given by:  Patient    Risks, benefits, and alternatives were discussed: yes      Risks discussed:  Infection, pain, poor cosmetic result and poor wound healing    Alternatives discussed:  Referral  [place] protocol:     Procedure explained and questions answered to patient or proxy's satisfaction: yes      Relevant documents present and verified: no      Test results available: yes      Imaging studies available: yes      Required blood products, implants, devices, and special equipment available: no      Site/side marked: no      Immediately prior to procedure, a time out was called: no      Patient identity confirmed:  Verbally with patient and arm band  Anesthesia:     Anesthesia method:  Local infiltration    Local anesthetic:  Lidocaine 1% WITH epi  Laceration details:     Location:  Shoulder/arm    Shoulder/arm location:  R lower arm    Length (cm):  3    Depth (mm):  10  Pre-procedure details:     Preparation:  Patient was prepped and draped in usual sterile fashion  and imaging obtained to evaluate for foreign bodies  Exploration:     Wound exploration: wound explored through full range of motion and entire depth of wound visualized      Wound extent: no foreign bodies/material noted      Contaminated: no    Treatment:     Area cleansed with:  Povidone-iodine    Amount of cleaning:  Standard    Irrigation solution:  Sterile saline    Irrigation volume:  40cc    Irrigation method:  Syringe  Skin repair:     Repair method:  Sutures    Suture size:  5-0    Suture material:  Nylon    Suture technique:  Simple interrupted    Number of sutures:  2  Approximation:     Approximation:  Loose  Repair type:     Repair type:  Simple  Post-procedure details:     Dressing:  Antibiotic ointment    Procedure completion:  Tolerated well, no immediate complications               ED Course                                                 MDM  Number of Diagnoses or Management Options  Dog bite of right forearm, initial encounter: new and requires workup  Laceration of skin of right forearm, initial encounter: new and requires workup  Diagnosis management comments: The patient presented with a laceration in need of repair. See laceration repair note for details. The wound was irrigated with copious normal saline irrigation. 2 sutures were used to loosely approximate the wound edges. Tetanus up to date.. The patient tolerated the procedure well. Acute bleeding has ceased and the wound was approximated in the emergency department. Patient was counseled to keep the wound clean, dry, and out of the sun. Patient was counseled to change dressings daily. Patient was advised to return to the ED for worsening erythema, pain, swelling, fever, excessive drainage or signs of infection. They were counseled to follow up for suture removal as described in the discharge instructions. Patient verbalizes understanding and agrees to follow up as instructed.       Amount and/or Complexity of Data Reviewed  Tests in  the radiology section of CPT®: reviewed    Risk of Complications, Morbidity, and/or Mortality  Presenting problems: low  Diagnostic procedures: low  Management options: low    Patient Progress  Patient progress: stable      Final diagnoses:   Dog bite of right forearm, initial encounter   Laceration of skin of right forearm, initial encounter       ED Disposition  ED Disposition     ED Disposition   Discharge    Condition   Stable    Comment   --             Lee Brunner, APRN  [address]  [address]  [facility] [address]  [phone]    In 1 week  For suture removal         Medication List      New Prescriptions    amoxicillin-clavulanate 875-125 MG per tablet  Commonly known as: AUGMENTIN  Take 1 tablet by mouth 2 (Two) Times a Day.           Where to Get Your Medications      These medications were sent to [facility]  [address] [place] [address]    Hours: Mon-Fri 9:00AM-7:30PM Saturday 9:00AM-2:00PM Phone: [phone]   · amoxicillin-clavulanate 875-125 MG per tablet          Mario Villalta, APRN  06/04/22 1389

## 2022-06-07 ENCOUNTER — PATIENT MESSAGE (OUTPATIENT)
Dept: FAMILY MEDICINE CLINIC | Facility: CLINIC | Age: 42
End: 2022-06-07

## 2022-06-07 ENCOUNTER — OFFICE VISIT (OUTPATIENT)
Dept: FAMILY MEDICINE CLINIC | Facility: CLINIC | Age: 42
End: 2022-06-07

## 2022-06-07 VITALS
BODY MASS INDEX: 37.38 KG/M2 | RESPIRATION RATE: 19 BRPM | WEIGHT: 276 LBS | HEART RATE: 100 BPM | DIASTOLIC BLOOD PRESSURE: 82 MMHG | HEIGHT: 72 IN | SYSTOLIC BLOOD PRESSURE: 130 MMHG | OXYGEN SATURATION: 98 % | TEMPERATURE: 98.4 F

## 2022-06-07 DIAGNOSIS — L03.113 CELLULITIS OF RIGHT UPPER EXTREMITY: ICD-10-CM
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
## 2022-06-07 PROCEDURE — 96372 THER/PROPH/DIAG INJ SC/IM: CPT | Performed by: STUDENT IN AN ORGANIZED HEALTH CARE EDUCATION/TRAINING PROGRAM

## 2022-06-07 PROCEDURE — 99213 OFFICE O/P EST LOW 20 MIN: CPT | Performed by: STUDENT IN AN ORGANIZED HEALTH CARE EDUCATION/TRAINING PROGRAM

## 2022-06-07 RX ORDER — CEFTRIAXONE 1 G/1
1 INJECTION, POWDER, FOR SOLUTION INTRAMUSCULAR; INTRAVENOUS ONCE
Status: DISCONTINUED | OUTPATIENT
Start: 2022-06-07 | End: 2022-06-07

## 2022-06-07 RX ORDER — CEFTRIAXONE 1 G/1
1 INJECTION, POWDER, FOR SOLUTION INTRAMUSCULAR; INTRAVENOUS ONCE
Status: COMPLETED | OUTPATIENT
Start: 2022-06-07 | End: 2022-06-07

## 2022-06-07 RX ADMIN — CEFTRIAXONE 1 G: 1 INJECTION, POWDER, FOR SOLUTION INTRAMUSCULAR; INTRAVENOUS at 15:59

## 2022-06-07 NOTE — PROGRESS NOTES
"Chief Complaint  Dog bite/cellulitis  Subjective         Fernando Verde is a 41 y.o. male who presents to [facility]    41 years old male comes to the clinic today to follow-up on dog bite/cellulitis.    Patient had dog bite 3 days ago, visited emergency room.  Had sutures done and discharged on Augmentin.  Patient reports somewhat improvement but still he was concerned of the cellulitis and warmth so he came to follow-up.  Denies any fever/severe pain or any other acute complaints.    Patient is up-to-date with Tdap, last Tdap was done within last 1 year.    Patient reports that incident was reported to the authorities/dog was up-to-date with all the vaccinations.    Objective   Vital Signs:   Vitals:    06/07/22 1536   BP: 130/82   Pulse: 100   Resp: 19   Temp: 98.4 °F (36.9 °C)   SpO2: 98%   Weight: 125 kg (276 lb)   Height: 182.9 cm (72\")      Body mass index is 37.43 kg/m².   Physical Exam  Musculoskeletal:        Arms:       Comments: Dog bite marks/surrounding minimum erythema, warmth, mild tenderness and edema noted.  Sutures were not placed, no discharge noted                       Assessment and Plan   Diagnoses and all orders for this visit:    1. Dog bite, subsequent encounter (Primary)  -     Discontinue: cefTRIAXone (ROCEPHIN) injection 1 g  -     cefTRIAXone (ROCEPHIN) injection 1 g    2. Cellulitis of right upper extremity  -     Discontinue: cefTRIAXone (ROCEPHIN) injection 1 g  -     cefTRIAXone (ROCEPHIN) injection 1 g      Due to no worsening with cellulitis (patient reported somewhat improvement) on Augmentin, I have recommended patient to continue with Augmentin.  We will give 1 shot of Rocephin today just to get secondary advantage due to patient's concern.  I have instructed patient to call me if any worsening or no improvement and we may consider changing antibiotics to clindamycin.  [place] decision made to closely follow-up and patient already has " appointment for suture removal on Friday.  Continue wound care/dressing discussed.    Follow Up   Return if symptoms worsen or fail to improve.  Patient was given instructions and counseling regarding his condition or for health maintenance advice. Please see specific information pulled into the AVS if appropriate.

## 2022-06-08 NOTE — TELEPHONE ENCOUNTER
From: Fernando Verde  To: Yamileth Hernandez MD  Sent: 6/7/2022 6:51 PM EDT  Subject: Work excuse    I forgot to get a work excuse for today.

## 2022-06-10 ENCOUNTER — OFFICE VISIT (OUTPATIENT)
Dept: FAMILY MEDICINE CLINIC | Facility: CLINIC | Age: 42
End: 2022-06-10

## 2022-06-10 VITALS
HEIGHT: 72 IN | OXYGEN SATURATION: 98 % | SYSTOLIC BLOOD PRESSURE: 118 MMHG | TEMPERATURE: 97.7 F | BODY MASS INDEX: 37.36 KG/M2 | DIASTOLIC BLOOD PRESSURE: 64 MMHG | HEART RATE: 116 BPM | WEIGHT: 275.8 LBS

## 2022-06-10 DIAGNOSIS — Z48.02 VISIT FOR SUTURE REMOVAL: ICD-10-CM

## 2022-06-10 DIAGNOSIS — L03.113 CELLULITIS OF RIGHT UPPER EXTREMITY: Primary | ICD-10-CM
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
## 2022-06-10 PROCEDURE — 99213 OFFICE O/P EST LOW 20 MIN: CPT | Performed by: NURSE PRACTITIONER

## 2022-06-10 RX ORDER — DOXYCYCLINE 100 MG/1
100 CAPSULE ORAL 2 TIMES DAILY
Qty: 20 CAPSULE | Refills: 0 | Status: SHIPPED | OUTPATIENT
Start: 2022-06-10 | End: 2022-07-13

## 2022-06-10 NOTE — PROGRESS NOTES
"Chief Complaint  Suture / Staple Removal    Subjective        Fernando Verde presents to [facility]  Patient presents to the office today for suture removal.  Patient was seen in the emergency department a week ago for dog bite to his right arm.  Patient was placed on Augmentin and has 2 sutures placed.  Does complain of tenderness at the area as well as yellow drainage and redness.  He does state that he noticed the dog was up-to-date on its shots as it was his neighbors dog.  Patient denies any fevers.      Objective   Vital Signs:  /64 (BP Location: Right arm, Patient Position: Sitting, Cuff Size: Adult)   Pulse 116   Temp 97.7 °F (36.5 °C) (Temporal)   Ht 182.9 cm (72\")   Wt 125 kg (275 lb 12.8 oz)   SpO2 98%   BMI 37.41 kg/m²   Estimated body mass index is 37.41 kg/m² as calculated from the following:    Height as of this encounter: 182.9 cm (72\").    Weight as of this encounter: 125 kg (275 lb 12.8 oz).    Class 2 Severe Obesity (BMI >=35 and <=39.9). Obesity-related health conditions include the following: none. Obesity is unchanged. BMI is is above average; no BMI management plan is appropriate. We discussed portion control and increasing exercise.      Physical Exam  Musculoskeletal:      Right forearm: Edema and tenderness present.      Comments: Dog bite noted to the right posterior distal forearm.  Area 5 cm x 4 cm erythemic, swollen and tender to touch.  2 sutures intact that were placed 1 week ago.        Result Review :        Suture Removal    Date/Time: 6/10/2022 2:48 PM  Performed by: Lee Brunner APRN  Authorized by: Lee Brunner APRN   Body area: upper extremity  Location details: right lower arm  Wound Appearance: red, warm and tender  Sutures Removed: 2  Patient tolerance: patient tolerated the procedure well with no immediate complications  Comments: Wound was well approximated although the area around the wound was erythemic warm and " tender.            Assessment and Plan   Diagnoses and all orders for this visit:    1. Cellulitis of right upper extremity (Primary)  -     doxycycline (MONODOX) 100 MG capsule; Take 1 capsule by mouth 2 (Two) Times a Day.  Dispense: 20 capsule; Refill: 0    2. Dog bite, subsequent encounter  -     doxycycline (MONODOX) 100 MG capsule; Take 1 capsule by mouth 2 (Two) Times a Day.  Dispense: 20 capsule; Refill: 0    3. Visit for suture removal    Other orders  -     Suture Removal    Asked the patient to follow-up with us next week to ensure that the redness was improving.  I did tell him to keep area clean and dry         Follow Up {Instructions Charge Capture  Follow-up Communications :23}  Return in about 1 week (around 6/17/2022), or if symptoms worsen or fail to improve.  Patient was given instructions and counseling regarding his condition or for health maintenance advice. Please see specific information pulled into the AVS if appropriate.

## 2022-06-12 PROCEDURE — U0004 COV-19 TEST NON-CDC HGH THRU: HCPCS | Performed by: NURSE PRACTITIONER

## 2022-06-13 ENCOUNTER — TELEPHONE (OUTPATIENT)
Dept: URGENT CARE | Facility: CLINIC | Age: 42
End: 2022-06-13

## 2022-06-13 DIAGNOSIS — F41.9 ANXIETY: Primary | ICD-10-CM

## 2022-06-13 NOTE — TELEPHONE ENCOUNTER
Patient was contacted via phone at 0930.  Patient identifiers confirmed.  Patient was notified of his positive coronavirus test results.  Patient states he had no further questions.

## 2022-06-15 ENCOUNTER — TELEPHONE (OUTPATIENT)
Dept: PSYCHIATRY | Facility: CLINIC | Age: 42
End: 2022-06-15

## 2022-06-15 NOTE — TELEPHONE ENCOUNTER
Caller: ROSI RODAS    Relationship to patient: SELF    Best call back number: [phone]    Chief complaint: MY CHART VIDEO VISIT    Type of visit: INITIAL      If rescheduling, when is the original appointment: 07-13-22    Additional notes:PT SCHEDULED FOR VIDEO VISIT

## 2022-06-15 NOTE — TELEPHONE ENCOUNTER
Changed pt's initial evaluation visit to [email or web address] Video Visit. Called pt to inform him of this active change and advised that he is able to login to [email or web address] and has it setup before his appointment on 07/13/2022, and to download [employer] [place] Meeting just in case. Pt was understanding and agreed.

## 2022-07-13 ENCOUNTER — TELEMEDICINE (OUTPATIENT)
Dept: PSYCHIATRY | Facility: CLINIC | Age: 42
End: 2022-07-13

## 2022-07-13 DIAGNOSIS — F41.1 GENERALIZED ANXIETY DISORDER: ICD-10-CM

## 2022-07-13 DIAGNOSIS — F33.41 RECURRENT MAJOR DEPRESSIVE DISORDER, IN PARTIAL REMISSION: Primary | ICD-10-CM

## 2022-07-13 DIAGNOSIS — F41.0 PANIC ATTACK: ICD-10-CM

## 2022-07-13 PROCEDURE — 90792 PSYCH DIAG EVAL W/MED SRVCS: CPT | Performed by: NURSE PRACTITIONER

## 2022-07-13 NOTE — TREATMENT PLAN
Multi-Disciplinary Problems (from Behavioral Health Treatment Plan)    Active Problems     Problem: Anxiety  Start Date: 07/13/22    Problem Details: The patient self-scales this problem as a 5 with 10 being the worst.        Goal Priority Start Date Expected End Date End Date    Patient will develop and implement behavioral and cognitive strategies to reduce anxiety and irrational fears. -- 07/13/22 -- --    Goal Details: Progress toward goal:  Not appropriate to rate progress toward goal since this is the initial treatment plan.        Goal Intervention Frequency Start Date End Date    Help patient explore past emotional issues in relation to present anxiety. Weekly 07/13/22 --    Intervention Details: Duration of treatment until until remission of symptoms.        Goal Intervention Frequency Start Date End Date    Help patient develop an awareness of their cognitive and physical responses to anxiety. Weekly 07/13/22 --    Intervention Details: Duration of treatment until until remission of symptoms.                    Reviewed By     Jorge Luis Rodriguez APRN 07/13/22 9760                 I have discussed and reviewed this treatment plan with the patient.
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
## 2022-07-19 ENCOUNTER — HOSPITAL ENCOUNTER (EMERGENCY)
Facility: HOSPITAL | Age: 42
Discharge: HOME | End: 2022-07-19
Attending: EMERGENCY MEDICINE | Admitting: EMERGENCY MEDICINE

## 2022-07-19 ENCOUNTER — APPOINTMENT (OUTPATIENT)
Dept: GENERAL RADIOLOGY | Facility: HOSPITAL | Age: 42
End: 2022-07-19

## 2022-07-19 VITALS
HEIGHT: 76 IN | DIASTOLIC BLOOD PRESSURE: 87 MMHG | OXYGEN SATURATION: 100 % | HEART RATE: 93 BPM | TEMPERATURE: 98 F | RESPIRATION RATE: 18 BRPM | SYSTOLIC BLOOD PRESSURE: 129 MMHG | BODY MASS INDEX: 33.75 KG/M2 | WEIGHT: 277.12 LBS

## 2022-07-19 DIAGNOSIS — S83.92XA SPRAIN OF LEFT KNEE, UNSPECIFIED LIGAMENT, INITIAL ENCOUNTER: Primary | ICD-10-CM

## 2022-07-19 PROCEDURE — 99282 EMERGENCY DEPT VISIT SF MDM: CPT

## 2022-07-19 PROCEDURE — 96372 THER/PROPH/DIAG INJ SC/IM: CPT

## 2022-07-19 PROCEDURE — 73562 X-RAY EXAM OF KNEE 3: CPT

## 2022-07-19 PROCEDURE — 25010000002 KETOROLAC TROMETHAMINE PER 15 MG: Performed by: NURSE PRACTITIONER

## 2022-07-19 RX ORDER — KETOROLAC TROMETHAMINE 10 MG/1
10 TABLET, FILM COATED ORAL EVERY 6 HOURS PRN
Qty: 20 TABLET | Refills: 0 | Status: SHIPPED | OUTPATIENT
Start: 2022-07-19 | End: 2022-08-02 | Stop reason: ALTCHOICE

## 2022-07-19 RX ORDER — KETOROLAC TROMETHAMINE 30 MG/ML
30 INJECTION, SOLUTION INTRAMUSCULAR; INTRAVENOUS ONCE
Status: COMPLETED | OUTPATIENT
Start: 2022-07-19 | End: 2022-07-19

## 2022-07-19 RX ADMIN — KETOROLAC TROMETHAMINE 30 MG: 30 INJECTION, SOLUTION INTRAMUSCULAR; INTRAVENOUS at 16:58

## 2022-07-19 NOTE — ED PROVIDER NOTES
Time: 4:48 PM EDT  Arrived by: private car  Chief Complaint: Left knee pain  History provided by: Patient  History is limited by: N/A     History of Present Illness:  Patient is a 41 y.o. year old male who presents to the emergency department with complaint of left knee pain.  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  He states that the washer was leaking and there were absorbent pads lying on the floor.  He states he had to take something to the window and when he stepped on the pad it slid and he went down on his left knee in a awkward position.  He has used ice, ibuprofen, and elevation.  He is able to ambulate but with pain and a limp.          Patient Care Team  Primary Care Provider: Lee Brunner APRN    Past Medical History:     Allergies   Allergen Reactions   • Lisinopril Cough     Past Medical History:   Diagnosis Date   • ADHD (attention deficit hyperactivity disorder)    • Allergic conjunctivitis and rhinitis    • Anxiety    • Arthritis    • Asthma    • B12 deficiency anemia 01/29/2020   • Bilateral sciatica 07/20/2016   • Broken bones    • Chronic pain disorder    • Depressive disorder    • Essential hypertension 01/29/2020   • Forgetfulness    • Frequent urination    • Hearing loss    • Hemorrhoids    • HLD (hyperlipidemia) 01/29/2020   • HTN (hypertension)    • Lumbago 07/20/2016    with low back pain   • Migraine headache    • Night sweats    • Otalgia    • Recurrent otitis media    • SOB (shortness of breath)    • Spondylolisthesis, lumbar region 07/27/2016   • Tinnitus, left ear      Past Surgical History:   Procedure Laterality Date   • CLOSED REDUCTION RADIAL / ULNAR SHAFT FRACTURE  2013   • COLONOSCOPY  2017   • ELBOW ARTHROPLASTY  2014, 2016   • ENDOSCOPY  2017   • FRACTURE SURGERY     • HARDWARE REMOVAL  2015   • OTHER SURGICAL HISTORY  2014, 2015    Arm Surgery   • VASECTOMY       Family History   Problem Relation Age of Onset    • Bipolar disorder Mother    • Mental illness Mother         Pschiatric disorder   • Suicide Attempts Brother    • Self-Injurious Behavior  Brother    • Paranoid behavior Brother    • Drug abuse Brother    • Leukemia Maternal Aunt    • Stroke Maternal Grandfather    • Breast cancer Maternal Grandmother    • Lung cancer Maternal Grandmother    • Diabetes Maternal Grandmother    • Hyperlipidemia Maternal Grandmother    • Heart disease Paternal Grandfather    • Diabetes Paternal Grandfather    • COPD Paternal Grandfather    • Hyperlipidemia Paternal Grandfather    • Stroke Paternal Grandfather        Home Medications:  Prior to Admission medications    Medication Sig Start Date End Date Taking? Authorizing Provider   atorvastatin (LIPITOR) 20 MG tablet Take 1 tablet by mouth Daily. 22   Lee Brunner APRN   busPIRone (BUSPAR) 5 MG tablet Take 1 tablet by mouth 3 (Three) Times a Day As Needed (anxiety). 21   Luis Armando Padron APRN   escitalopram (Lexapro) 20 MG tablet Take 1 tablet by mouth Daily. 22   Lee Brunner APRN   famotidine (Pepcid) 40 MG tablet Take 1 tablet by mouth Daily. 22   Laura Villa APRN   ibuprofen (ADVIL,MOTRIN) 800 MG tablet Take 1 tablet by mouth 3 (Three) Times a Day. 2/10/22   Lee Brunner APRN   losartan (Cozaar) 100 MG tablet Take 1 tablet by mouth Daily. 3/28/22   Lee Brunner APRN   hydroCHLOROthiazide (HYDRODIURIL) 25 MG tablet Take 1 tablet by mouth Daily. 7/22/21 3/6/22  Luis Armando Padron APRN        Social History:   Social History     Tobacco Use   • Smoking status: Former Smoker     Packs/day: 1.00     Years: 15.00     Pack years: 15.00     Types: Cigarettes, Cigarettes     Quit date: 2017     Years since quittin.5   • Smokeless tobacco: Never Used   Vaping Use   • Vaping Use: Never used   Substance Use Topics   • Alcohol use: Not Currently     Comment: occasionally   • Drug use: Never     Recent travel: no     Review of Systems:  Review of Systems  "  Constitutional: Negative for chills and fever.   HENT: Negative for congestion, ear pain, rhinorrhea and sore throat.    Eyes: Negative for pain.   Respiratory: Negative for cough and shortness of breath.    Cardiovascular: Negative for chest pain.   Gastrointestinal: Negative for abdominal pain, diarrhea, nausea and vomiting.   Genitourinary: Negative for decreased urine volume, dysuria and flank pain.   Musculoskeletal: Positive for arthralgias (Left knee) and gait problem. Negative for myalgias.   Skin: Negative for rash.   Neurological: Negative for seizures and headaches.   All other systems reviewed and are negative.       Physical Exam:  /87   Pulse 93   Temp 98 °F (36.7 °C) (Oral)   Resp 18   Ht 193 cm (76\")   Wt 126 kg (277 lb 1.9 oz)   SpO2 100%   BMI 33.73 kg/m²     Physical Exam  Vitals and nursing note reviewed.   Constitutional:       General: He is not in acute distress.     Appearance: Normal appearance. He is normal weight. He is not ill-appearing, toxic-appearing or diaphoretic.   HENT:      Head: Normocephalic and atraumatic.      Right Ear: External ear normal.      Left Ear: External ear normal.   Eyes:      General: No scleral icterus.     Conjunctiva/sclera: Conjunctivae normal.      Pupils: Pupils are equal, round, and reactive to light.   Cardiovascular:      Rate and Rhythm: Normal rate.   Pulmonary:      Effort: Pulmonary effort is normal. No respiratory distress.   Abdominal:      General: Abdomen is flat. There is no distension.      Tenderness: There is no abdominal tenderness.   Musculoskeletal:         General: Tenderness (Left knee) present. No swelling, deformity or signs of injury. Normal range of motion.      Cervical back: Normal range of motion and neck supple.   Skin:     General: Skin is warm and dry.      Capillary Refill: Capillary refill takes less than 2 seconds.   Neurological:      General: No focal deficit present.      Mental Status: He is alert and " oriented to person, place, and time.   Psychiatric:         Mood and Affect: Mood normal.         Behavior: Behavior normal.                Medications in the Emergency Department:  Medications   ketorolac (TORADOL) injection 30 mg (30 mg Intramuscular Given 7/19/22 2882)        Labs  Lab Results (last 24 hours)     ** No results found for the last 24 hours. **           Imaging:  XR Knee 3 View Left    Result Date: 7/19/2022  PROCEDURE: XR KNEE 3 VW LEFT  COMPARISON: None  INDICATIONS: LEFT KNEE PAIN AFTER FALL YESTERDAY  FINDINGS:  There is no acute fracture or dislocation. Joint spaces appear normal. No erosions. Soft tissues are unremarkable.  No effusion.       No acute osseous abnormality or significant degenerative change.       MYLA INFANTE MD       Electronically Signed and Approved By: MYLA INFANTE MD on 7/19/2022 at 16:29               Procedures:  Procedures    Progress                            Medical Decision Making:  MDM  Number of Diagnoses or Management Options  Sprain of left knee, unspecified ligament, initial encounter: new and requires workup     Amount and/or Complexity of Data Reviewed  Tests in the radiology section of CPT®: reviewed    Risk of Complications, Morbidity, and/or Mortality  Presenting problems: moderate  Diagnostic procedures: moderate  Management options: moderate    Patient Progress  Patient progress: stable       Final diagnoses:   Sprain of left knee, unspecified ligament, initial encounter        Disposition:  ED Disposition     ED Disposition   Discharge    Condition   Stable    Comment   --             This medical record created using voice recognition software.           Hannah Nicolas, APRN  07/19/22 1254

## 2022-07-25 DIAGNOSIS — F41.8 SITUATIONAL ANXIETY: ICD-10-CM

## 2022-07-26 RX ORDER — ESCITALOPRAM OXALATE 20 MG/1
20 TABLET ORAL DAILY
Qty: 90 TABLET | Refills: 1 | Status: SHIPPED | OUTPATIENT
Start: 2022-07-26 | End: 2023-02-03 | Stop reason: SDUPTHER

## 2022-08-02 ENCOUNTER — OFFICE VISIT (OUTPATIENT)
Dept: FAMILY MEDICINE CLINIC | Facility: CLINIC | Age: 42
End: 2022-08-02

## 2022-08-02 VITALS
WEIGHT: 274.6 LBS | TEMPERATURE: 97.6 F | OXYGEN SATURATION: 99 % | BODY MASS INDEX: 33.44 KG/M2 | DIASTOLIC BLOOD PRESSURE: 84 MMHG | HEART RATE: 124 BPM | SYSTOLIC BLOOD PRESSURE: 128 MMHG | HEIGHT: 76 IN

## 2022-08-02 DIAGNOSIS — E78.2 MIXED HYPERLIPIDEMIA: Primary | ICD-10-CM

## 2022-08-02 DIAGNOSIS — I10 PRIMARY HYPERTENSION: ICD-10-CM

## 2022-08-02 PROCEDURE — 99214 OFFICE O/P EST MOD 30 MIN: CPT | Performed by: NURSE PRACTITIONER

## 2022-08-02 RX ORDER — ATORVASTATIN CALCIUM 20 MG/1
20 TABLET, FILM COATED ORAL DAILY
Qty: 30 TABLET | Refills: 2 | Status: SHIPPED | OUTPATIENT
Start: 2022-08-02 | End: 2023-01-08 | Stop reason: SDUPTHER

## 2022-08-02 RX ORDER — LOSARTAN POTASSIUM 100 MG/1
100 TABLET ORAL DAILY
Qty: 90 TABLET | Refills: 1 | Status: SHIPPED | OUTPATIENT
Start: 2022-08-02 | End: 2022-09-30 | Stop reason: SDUPTHER

## 2022-08-02 RX ORDER — IBUPROFEN 800 MG/1
800 TABLET ORAL 3 TIMES DAILY PRN
Qty: 180 TABLET | Refills: 0 | Status: SHIPPED | OUTPATIENT
Start: 2022-08-02

## 2022-08-02 NOTE — PROGRESS NOTES
"Chief Complaint  Hypertension (6 month follow up)    Subjective        Fernando Verde presents to [facility]  Presents to the office today for 6-month follow-up regarding his hypertension hyperlipidemia.  Patient does state that he is needing refills of his medications.  Blood pressure arrival today is 128/84.  He denies any chest pain shortness breath palpitations this time.  He denies any other concerns or complaints at this time.      Objective   Vital Signs:  /84 (BP Location: Right arm, Patient Position: Sitting, Cuff Size: Adult)   Pulse (!) 124   Temp 97.6 °F (36.4 °C) (Temporal)   Ht 193 cm (76\")   Wt 125 kg (274 lb 9.6 oz)   SpO2 99%   BMI 33.43 kg/m²   Estimated body mass index is 33.43 kg/m² as calculated from the following:    Height as of this encounter: 193 cm (76\").    Weight as of this encounter: 125 kg (274 lb 9.6 oz).    BMI is >= 30 and <35. (Class 1 Obesity). The following options were offered after discussion;: nutrition counseling/recommendations      Physical Exam  Vitals reviewed.   Constitutional:       Appearance: Normal appearance.   Cardiovascular:      Rate and Rhythm: Normal rate and regular rhythm.      Pulses: Normal pulses.      Heart sounds: Normal heart sounds, S1 normal and S2 normal. No murmur heard.  Pulmonary:      Effort: Pulmonary effort is normal. No respiratory distress.      Breath sounds: Normal breath sounds.   Skin:     General: Skin is warm and dry.   Neurological:      Mental Status: He is alert and oriented to person, place, and time.   Psychiatric:         Attention and Perception: Attention normal.         Mood and Affect: Mood normal.         Behavior: Behavior normal.        Result Review :                Assessment and Plan   Diagnoses and all orders for this visit:    1. Mixed hyperlipidemia (Primary)  Comments:  well controlled on medication and will conintue  Orders:  -     atorvastatin (LIPITOR) 20 MG " tablet; Take 1 tablet by mouth Daily.  Dispense: 30 tablet; Refill: 2  -     Comprehensive Metabolic Panel; Future  -     Lipid Panel; Future    2. Primary hypertension  -     losartan (Cozaar) 100 MG tablet; Take 1 tablet by mouth Daily.  Dispense: 90 tablet; Refill: 1  -     CBC (No Diff); Future  -     Comprehensive Metabolic Panel; Future    Other orders  -     ibuprofen (ADVIL,MOTRIN) 800 MG tablet; Take 1 tablet by mouth 3 (Three) Times a Day As Needed for Moderate Pain .  Dispense: 180 tablet; Refill: 0             Follow Up   Return in about 6 months (around 2/2/2023) for Recheck.  Patient was given instructions and counseling regarding his condition or for health maintenance advice. Please see specific information pulled into the AVS if appropriate.

## 2022-09-30 ENCOUNTER — TELEPHONE (OUTPATIENT)
Dept: FAMILY MEDICINE CLINIC | Facility: CLINIC | Age: 42
End: 2022-09-30

## 2022-09-30 DIAGNOSIS — I10 PRIMARY HYPERTENSION: Primary | ICD-10-CM

## 2022-09-30 RX ORDER — LOSARTAN POTASSIUM AND HYDROCHLOROTHIAZIDE 25; 100 MG/1; MG/1
1 TABLET ORAL DAILY
Qty: 90 TABLET | Refills: 1 | Status: SHIPPED | OUTPATIENT
Start: 2022-09-30 | End: 2023-02-03 | Stop reason: SDUPTHER

## 2022-09-30 NOTE — TELEPHONE ENCOUNTER
----- Message from Fernando Verde sent at 9/30/2022 12:54 PM EDT -----  Regarding: medication  That’s fine.

## 2022-09-30 NOTE — TELEPHONE ENCOUNTER
Per Lee, There is a medication called Hyzaar that is losartan and hydrochlorothiazide mixed.  Looks like the hydrochlorothiazide has been discontinued.  If he is having swelling we can put him back on the hydrochlorothiazide and send the new medicine then so they are both combined       Sent Veltit message

## 2022-09-30 NOTE — TELEPHONE ENCOUNTER
Caller: Fernando Verde    Relationship: Self    Best call back number: [phone]    What is the best time to reach you: ANY    Who are you requesting to speak with (clinical staff, provider,  specific staff member): CLINICAL STAFF    What was the call regarding: PATIENT CALLED WANTING TO KNOW IF THERE WAS A MIXTURE OF MEDICATION HE COULD TAKE OF THE HYDROCHLOROTHIAZIDE AND LOSARTAN.     PHARMACY: [facility] - [place]  [phone]    Do you require a callback: YES

## 2022-12-23 ENCOUNTER — HOSPITAL ENCOUNTER (EMERGENCY)
Facility: HOSPITAL | Age: 42
Discharge: HOME | End: 2022-12-23
Attending: EMERGENCY MEDICINE | Admitting: EMERGENCY MEDICINE

## 2022-12-23 ENCOUNTER — APPOINTMENT (OUTPATIENT)
Dept: GENERAL RADIOLOGY | Facility: HOSPITAL | Age: 42
End: 2022-12-23

## 2022-12-23 VITALS
TEMPERATURE: 98.1 F | WEIGHT: 283.07 LBS | HEIGHT: 76 IN | SYSTOLIC BLOOD PRESSURE: 119 MMHG | RESPIRATION RATE: 18 BRPM | HEART RATE: 68 BPM | OXYGEN SATURATION: 96 % | DIASTOLIC BLOOD PRESSURE: 78 MMHG | BODY MASS INDEX: 34.47 KG/M2
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
## 2022-12-23 DIAGNOSIS — S50.12XA CONTUSION OF LEFT FOREARM, INITIAL ENCOUNTER: ICD-10-CM

## 2022-12-23 DIAGNOSIS — S52.125A CLOSED NONDISPLACED FRACTURE OF HEAD OF LEFT RADIUS, INITIAL ENCOUNTER: Primary | ICD-10-CM

## 2022-12-23 PROCEDURE — 99283 EMERGENCY DEPT VISIT LOW MDM: CPT

## 2022-12-23 PROCEDURE — 73090 X-RAY EXAM OF FOREARM: CPT

## 2022-12-23 RX ORDER — IBUPROFEN 400 MG/1
800 TABLET ORAL ONCE
Status: COMPLETED | OUTPATIENT
Start: 2022-12-23 | End: 2022-12-23

## 2022-12-23 RX ORDER — HYDROCODONE BITARTRATE AND ACETAMINOPHEN 7.5; 325 MG/1; MG/1
1 TABLET ORAL EVERY 6 HOURS PRN
Qty: 12 TABLET | Refills: 0 | Status: SHIPPED | OUTPATIENT
Start: 2022-12-23

## 2022-12-23 RX ADMIN — IBUPROFEN 800 MG: 400 TABLET ORAL at 08:09

## 2022-12-23 NOTE — ED PROVIDER NOTES
Time: 7:56 AM EST  Arrived by: private car  Chief Complaint: arm injury  History provided by: patient  History is limited by: N/A        History of Present Illness:  Patient is a 42 y.o. year old male who presents to the emergency department with left arm/elbow pain/injury resulting from falling onto ice. Patient is holding his left elbow and forearm and states he feels tingling down left arm. Patient states he slipped at work.  Patient denies flu-like symptoms. Patient states he did not hit his head and denies LOC.       History provided by:  Patient   used: No        Patient Care Team  Primary Care Provider: Lee Brunner APRN    Past Medical History:     Allergies   Allergen Reactions   • Lisinopril Cough     Past Medical History:   Diagnosis Date   • ADHD (attention deficit hyperactivity disorder)    • Allergic conjunctivitis and rhinitis    • Anxiety    • Arthritis    • Asthma    • B12 deficiency anemia 01/29/2020   • Bilateral sciatica 07/20/2016   • Broken bones    • Chronic pain disorder    • Depressive disorder    • Essential hypertension 01/29/2020   • Forgetfulness    • Frequent urination    • Hearing loss    • Hemorrhoids    • HLD (hyperlipidemia) 01/29/2020   • HTN (hypertension)    • Lumbago 07/20/2016    with low back pain   • Migraine headache    • Night sweats    • Otalgia    • Recurrent otitis media    • SOB (shortness of breath)    • Spondylolisthesis, lumbar region 07/27/2016   • Tinnitus, left ear      Past Surgical History:   Procedure Laterality Date   • CLOSED REDUCTION RADIAL / ULNAR SHAFT FRACTURE  2013   • COLONOSCOPY  2017   • ELBOW ARTHROPLASTY  2014, 2016   • ENDOSCOPY  2017   • FRACTURE SURGERY     • HARDWARE REMOVAL  2015   • OTHER SURGICAL HISTORY  2014, 2015    Arm Surgery   • VASECTOMY       Family History   Problem Relation Age of Onset   • Bipolar disorder Mother    • Mental illness Mother         Pschiatric disorder   • Suicide Attempts Brother    •  Self-Injurious Behavior  Brother    • Paranoid behavior Brother    • Drug abuse Brother    • Leukemia Maternal Aunt    • Stroke Maternal Grandfather    • Heart disease Maternal Grandfather    • Breast cancer Maternal Grandmother    • Lung cancer Maternal Grandmother    • Diabetes Maternal Grandmother    • Hyperlipidemia Maternal Grandmother    • Cancer Maternal Grandmother         Breast and Lung   • Heart disease Paternal Grandfather    • Diabetes Paternal Grandfather    • COPD Paternal Grandfather    • Hyperlipidemia Paternal Grandfather    • Stroke Paternal Grandfather    • Hypertension Paternal Grandfather        Home Medications:  Prior to Admission medications    Medication Sig Start Date End Date Taking? Authorizing Provider   atorvastatin (LIPITOR) 20 MG tablet Take 1 tablet by mouth Daily. 22   Lee Brunner APRN   busPIRone (BUSPAR) 5 MG tablet Take 1 tablet by mouth 3 (Three) Times a Day As Needed (anxiety). 21   Luis Armando Padron APRN   escitalopram (Lexapro) 20 MG tablet Take 1 tablet by mouth Daily. 22   Lee Brunenr APRN   famotidine (Pepcid) 40 MG tablet Take 1 tablet by mouth Daily. 22   Laura Villa APRN   ibuprofen (ADVIL,MOTRIN) 800 MG tablet Take 1 tablet by mouth 3 (Three) Times a Day As Needed for Moderate Pain . 22   Lee Brunner APRN   losartan-hydrochlorothiazide (Hyzaar) 100-25 MG per tablet Take 1 tablet by mouth Daily. 22   Lee Brunner APRN   hydroCHLOROthiazide (HYDRODIURIL) 25 MG tablet Take 1 tablet by mouth Daily. 7/22/21 3/6/22  Luis Armando Padron APRN        Social History:   Social History     Tobacco Use   • Smoking status: Former     Packs/day: 1.00     Years: 15.00     Pack years: 15.00     Types: Cigarettes     Quit date: 2017     Years since quittin.9   • Smokeless tobacco: Never   Vaping Use   • Vaping Use: Never used   Substance Use Topics   • Alcohol use: Not Currently     Comment: occasionally   • Drug use: Never     Recent  "travel: not applicable    Review of Systems:  Review of Systems   Constitutional: Negative for chills and fever.   HENT: Negative for congestion, rhinorrhea and sore throat.    Eyes: Negative for pain and visual disturbance.   Respiratory: Negative for apnea, cough, chest tightness and shortness of breath.    Cardiovascular: Negative for chest pain and palpitations.   Gastrointestinal: Negative for abdominal pain, diarrhea, nausea and vomiting.   Genitourinary: Negative for difficulty urinating and dysuria.   Musculoskeletal: Negative for joint swelling and myalgias.        Positive for left arm pain   Skin: Negative for color change.   Neurological: Positive for numbness (tingling left arm). Negative for seizures and headaches.   Psychiatric/Behavioral: Negative.    All other systems reviewed and are negative.       Physical Exam:  /78   Pulse 68   Temp 98.1 °F (36.7 °C) (Oral)   Resp 18   Ht 193 cm (76\")   Wt 128 kg (283 lb 1.1 oz)   SpO2 96%   BMI 34.46 kg/m²     Physical Exam  Vitals and nursing note reviewed.   Constitutional:       General: He is not in acute distress.     Appearance: Normal appearance. He is not toxic-appearing.      Comments: Moderately uncomfortable due to pain    HENT:      Head: Normocephalic and atraumatic.      Mouth/Throat:      Mouth: Mucous membranes are moist.   Eyes:      General: No scleral icterus.  Cardiovascular:      Rate and Rhythm: Normal rate and regular rhythm.      Pulses: Normal pulses.           Radial pulses are 2+ on the right side and 2+ on the left side.      Heart sounds: Normal heart sounds. No murmur heard.    No friction rub.   Pulmonary:      Effort: Pulmonary effort is normal. No respiratory distress.      Breath sounds: Normal breath sounds. No decreased breath sounds, wheezing, rhonchi or rales.   Abdominal:      General: Abdomen is flat. Bowel sounds are normal. There is no distension.      Palpations: Abdomen is soft.      Tenderness: There is " no abdominal tenderness. There is no guarding or rebound.   Musculoskeletal:         General: Normal range of motion.      Right elbow: Normal.      Left elbow: Swelling (lateral) present. No deformity. Tenderness (lateral) present.      Cervical back: Normal range of motion and neck supple.      Right lower leg: No edema.      Left lower leg: No edema.      Comments: Distal left hand and left wrist neurovascular intact   Skin:     General: Skin is warm and dry.   Neurological:      Mental Status: He is alert and oriented to person, place, and time. Mental status is at baseline.                Medications in the Emergency Department:  Medications   ibuprofen (ADVIL,MOTRIN) tablet 800 mg (800 mg Oral Given 12/23/22 0809)        Labs  Lab Results (last 24 hours)     ** No results found for the last 24 hours. **           Imaging:  XR Forearm 2 View Left    Result Date: 12/23/2022  PROCEDURE: XR FOREARM 2 VW LEFT  COMPARISON: None  INDICATIONS: trauma  FINDINGS:   There is slight cortical irregularity of the radial head on the lateral view suspicious for a nondisplaced fracture.  No other fractures or acute osseous abnormalities are identified.  IMPRESSION: Subtle cortical irregularity of the radial head suspicious for a nondisplaced left radial head fracture.   CRYSTAL ORTIZ MD       Electronically Signed and Approved By: CRYSTAL ORTIZ MD on 12/23/2022 at 8:26               Procedures:  Procedures    Progress                            Medical Decision Making:  MDM       This patient is a 42-year-old male who was just arriving to work here at the hospital when he slipped on the ice outside landing directly on his left elbow and forearm.    Is now having significant pain and swelling in the proximal lateral left forearm, just near the elbow.    Is neurovascularly intact and no other injuries were seen.    I gave him some ibuprofen for pain and we performed x-rays of the left forearm that just demonstrates a  subtle nondisplaced left radial head fracture.    For this radial head fracture we went ahead and immobilized him with Ortho-Glass sugar-tong splint, and I called in prescription for ibuprofen 800 and some pain medicine and I will have him follow-up urgently with orthopedics and given a work excuse note.      Final diagnoses:   Contusion of left forearm, initial encounter   Fall due to slipping on ice or snow, initial encounter   Closed nondisplaced fracture of head of left radius, initial encounter        Disposition:  ED Disposition     ED Disposition   Discharge    Condition   Stable    Comment   Patient discharged. Pharmacy verified by the patient.              This medical record created using voice recognition software and a virtual scribe.    Documentation assistance provided by Michaela Lowe acting as scribe for No att. charan rainey MD. Information recorded by the scribe was done at my direction and has been verified and validated by me.         Michaela Lowe  12/23/22 7875       Presley Cantor MD  12/23/22 7900

## 2022-12-29 ENCOUNTER — OFFICE VISIT (OUTPATIENT)
Dept: ORTHOPEDIC SURGERY | Facility: CLINIC | Age: 42
End: 2022-12-29

## 2022-12-29 VITALS — HEIGHT: 76 IN | BODY MASS INDEX: 34.46 KG/M2 | WEIGHT: 283 LBS

## 2022-12-29 DIAGNOSIS — S52.135A CLOSED NONDISPLACED FRACTURE OF NECK OF LEFT RADIUS, INITIAL ENCOUNTER: Primary | ICD-10-CM

## 2022-12-29 PROCEDURE — 99203 OFFICE O/P NEW LOW 30 MIN: CPT | Performed by: ORTHOPAEDIC SURGERY

## 2022-12-29 NOTE — PROGRESS NOTES
"Chief Complaint  Initial Evaluation of the Left Elbow     Subjective      Fernando Verde presents to [facility] for initial evaluation of the left elbow.  He went to the emergency department on 22 with left arm/elbow pain/injury resulting from falling onto ice. Patient is holding his left elbow and forearm and states he feels tingling down left arm. Patient states he slipped at work.  He presented in ace wrap and and a sling.     Allergies   Allergen Reactions   • Lisinopril Cough        Social History     Socioeconomic History   • Marital status:    Tobacco Use   • Smoking status: Former     Packs/day: 1.00     Years: 15.00     Pack years: 15.00     Types: Cigarettes     Quit date: 2017     Years since quittin.9   • Smokeless tobacco: Never   Vaping Use   • Vaping Use: Never used   Substance and Sexual Activity   • Alcohol use: Not Currently     Comment: occasionally   • Drug use: Never   • Sexual activity: Yes     Partners: Female     Birth control/protection: None        Review of Systems     Objective   Vital Signs:   Ht 193 cm (76\")   Wt 128 kg (283 lb)   BMI 34.45 kg/m²       Physical Exam  Constitutional:       Appearance: Normal appearance. Patient is well-developed and normal weight.   HENT:      Head: Normocephalic.      Right Ear: Hearing and external ear normal.      Left Ear: Hearing and external ear normal.      Nose: Nose normal.   Eyes:      Conjunctiva/sclera: Conjunctivae normal.   Cardiovascular:      Rate and Rhythm: Normal rate.   Pulmonary:      Effort: Pulmonary effort is normal.      Breath sounds: No wheezing or rales.   Abdominal:      Palpations: Abdomen is soft.      Tenderness: There is no abdominal tenderness.   Musculoskeletal:      Cervical back: Normal range of motion.   Skin:     Findings: No rash.   Neurological:      Mental Status: Patient is alert and oriented to person, place, and time.   Psychiatric:         Mood and " Affect: Mood and affect normal.         Judgment: Judgment normal.       Ortho Exam      LEFT ELBOW radial pulse 2+. Ulnar pulse 2+. Good tone of deltoid, bicep, triceps, wrist extensors and flexors. He is unable to pronate and supinate.  He has limited ROM of the elbow. Sensation intact. Neurovascular Intact. Minimal swelling.       Procedures      Imaging Results (Most Recent)     None           Result Review :       XR Forearm 2 View Left    Result Date: 12/23/2022  Narrative: PROCEDURE: XR FOREARM 2 VW LEFT  COMPARISON: None  INDICATIONS: trauma  FINDINGS:   There is slight cortical irregularity of the radial head on the lateral view suspicious for a nondisplaced fracture.  No other fractures or acute osseous abnormalities are identified.  IMPRESSION: Subtle cortical irregularity of the radial head suspicious for a nondisplaced left radial head fracture.   CRYSTAL ORTIZ MD       Electronically Signed and Approved By: CRYSTAL ORTIZ MD on 12/23/2022 at 8:26                      Assessment and Plan     Diagnoses and all orders for this visit:    1. Closed nondisplaced fracture of neck of left radius, initial encounter (Primary)      Discussed the treatment plan with the patient. Patient given a splint for the left arm to use off and on as needed.  Take out of splint at home and do gentle ROM.  Discussed activity modification to prevent further injury and decrease pain. X ray next visit.    Call or return if worsening symptoms.    Follow Up     10 days       Patient was given instructions and counseling regarding his condition or for health maintenance advice. Please see specific information pulled into the AVS if appropriate.     Scribed for Waqas Thompson MD by Denise Toure MA.  12/29/22   08:29 EST    I have personally performed the services described in this document as scribed by the above individual and it is both accurate and complete. Waqas Thompson MD 12/29/22

## 2023-01-08 DIAGNOSIS — E78.2 MIXED HYPERLIPIDEMIA: ICD-10-CM

## 2023-01-09 RX ORDER — ATORVASTATIN CALCIUM 20 MG/1
20 TABLET, FILM COATED ORAL DAILY
Qty: 30 TABLET | Refills: 2 | Status: SHIPPED | OUTPATIENT
Start: 2023-01-09 | End: 2023-02-03 | Stop reason: SDUPTHER

## 2023-01-10 ENCOUNTER — OFFICE VISIT (OUTPATIENT)
Dept: ORTHOPEDIC SURGERY | Facility: CLINIC | Age: 43
End: 2023-01-10
Payer: COMMERCIAL

## 2023-01-10 VITALS — HEIGHT: 76 IN | BODY MASS INDEX: 34.46 KG/M2 | WEIGHT: 283 LBS

## 2023-01-10 DIAGNOSIS — S52.135A CLOSED NONDISPLACED FRACTURE OF NECK OF LEFT RADIUS, INITIAL ENCOUNTER: ICD-10-CM

## 2023-01-10 DIAGNOSIS — M79.632 LEFT FOREARM PAIN: Primary | ICD-10-CM

## 2023-01-10 PROCEDURE — 99213 OFFICE O/P EST LOW 20 MIN: CPT | Performed by: PHYSICIAN ASSISTANT

## 2023-01-10 NOTE — PROGRESS NOTES
Chief Complaint  Follow-up of the Left Forearm    Subjective      Fernando Verde presents to [facility] for follow-up of left radial head fracture sustained in fall on ice on 12/23/2022.  Patient was evaluated in clinic on 12/29/2022 and recommended to continue use of splint with instruction to take this on and off as needed and continue sling.  He presents today with sling in place.  The patient reports he works in [employer] at [employer] and has returned to work with light duty restrictions.  He has discontinued the splint altogether.  Does report continued pain in his left elbow, particularly when he moves his left arm or attempts to  an object.    Objective   Allergies   Allergen Reactions   • Lisinopril Cough       Vital Signs:   Ht 193 cm (76\")   Wt 128 kg (283 lb)   BMI 34.45 kg/m²       Physical Exam    Constitutional: Awake, alert. Well nourished appearance.    Integumentary: Warm, dry, intact. No obvious rashes.    HENT: Atraumatic, normocephalic.   Respiratory: Non labored respirations .   Cardiovascular: Intact peripheral pulses.    Psychiatric: Normal mood and affect. A&O X3    Ortho Exam  Left elbow: Tenderness to palpation of proximal radius.  Pain limited ROM, however, near full elbow extension, lacking approximately 45 degrees.  Full flexion and extension of the wrist.  Pain limited supination and pronation.  Patient is able to form a full fist.  Sensation intact to light touch.  Distal neurovascular intact.    Imaging Results (Most Recent)     Procedure Component Value Units Date/Time    XR Forearm 2 View Left [[id number]] Resulted: 01/10/23 1230     Updated: 01/10/23 1231    Narrative:      X-Ray Report:  Study: X-rays ordered, taken in the office, and reviewed today.   Site: Left forearm xray  Indication: Fracture  View: AP/Lateral view(s)  Findings: Early healing noted to left radial head fracture.  Prior studies available for comparison: yes                        Assessment and Plan   Problem List Items Addressed This Visit    None  Visit Diagnoses     Left forearm pain    -  Primary    Relevant Orders    XR Forearm 2 View Left (Completed)    Closed nondisplaced fracture of neck of left radius, subsequent encounter              Follow Up   Return in about 3 weeks (around 1/31/2023).  Educated on risk of smoking. Discussed options for smoking cessation.    Patient Instructions   X-rays taken and reviewed. Advised to stop splint. Continue sling, particularly while at work. Educated on gentle ROM of wrist and elbow. No lifting, pushing, or pulling. Nothing heavier than 1 lb in left hand.  Continue light duty restrictions.  Avoid high impact activity.    Follow up in 3 weeks. Repeat x-rays needed. Call with changes or concerns.     Patient was given instructions and counseling regarding his condition or for health maintenance advice. Please see specific information pulled into the AVS if appropriate.

## 2023-01-10 NOTE — PATIENT INSTRUCTIONS
X-rays taken and reviewed. Advised to stop splint. Continue sling, particularly while at work. Educated on gentle ROM of wrist and elbow. No lifting, pushing, or pulling. Nothing heavier than 1 lb in left hand.  Continue light duty restrictions.  Avoid high impact activity.    Follow up in 3 weeks. Repeat x-rays needed. Call with changes or concerns.

## 2023-01-31 ENCOUNTER — OFFICE VISIT (OUTPATIENT)
Dept: ORTHOPEDIC SURGERY | Facility: CLINIC | Age: 43
End: 2023-01-31
Payer: COMMERCIAL

## 2023-01-31 ENCOUNTER — TELEPHONE (OUTPATIENT)
Dept: ORTHOPEDIC SURGERY | Facility: CLINIC | Age: 43
End: 2023-01-31
Payer: COMMERCIAL

## 2023-01-31 VITALS — HEIGHT: 72 IN | BODY MASS INDEX: 38.74 KG/M2 | WEIGHT: 286 LBS

## 2023-01-31 DIAGNOSIS — M79.632 LEFT FOREARM PAIN: Primary | ICD-10-CM

## 2023-01-31 DIAGNOSIS — S52.135A CLOSED NONDISPLACED FRACTURE OF NECK OF LEFT RADIUS, INITIAL ENCOUNTER: ICD-10-CM

## 2023-01-31 PROCEDURE — 99213 OFFICE O/P EST LOW 20 MIN: CPT | Performed by: PHYSICIAN ASSISTANT

## 2023-01-31 NOTE — PROGRESS NOTES
"Chief Complaint  Follow-up of the Left Forearm    Subjective      Fernando Verde presents to [facility] for a follow up for his left radial head fracture sustained in fall on ice on 12/23/2022 where he fell at the hospital. Patient presents in a sling today. The patient reports he works in [employer] at [employer] and has returned to work with light duty restrictions. He states his range of motion is getting better but still has some pain. This is a work comp injury.       Objective   Allergies   Allergen Reactions   • Lisinopril Cough       Vital Signs:   Ht 182.9 cm (72\")   Wt 130 kg (286 lb)   BMI 38.79 kg/m²       Physical Exam    Constitutional: Awake, alert. Well nourished appearance.    Integumentary: Warm, dry, intact. No obvious rashes.    HENT: Atraumatic, normocephalic.   Respiratory: Non labored respirations .   Cardiovascular: Intact peripheral pulses.    Psychiatric: Normal mood and affect. A&O X3    Ortho Exam     Left upper extremity: significant limited range of motion to the elbow, limitied supination and pronation, non tender over the proximal radius, neurovascularly intact, full finger range of motion    Imaging Results (Most Recent)     Procedure Component Value Units Date/Time    XR Forearm 2 View Left [[id number]] Resulted: 01/31/23 1333     Updated: 01/31/23 1335    Narrative:      X-Ray Report:  Study: X-rays ordered, taken in the office, and reviewed today.   Site: Left forearm Xray  Indication: Fracture  View: AP/Lateral view(s)  Findings: Well healing left radial neck fracture. No displacement.   Prior studies available for comparison: yes                       Assessment and Plan   Problem List Items Addressed This Visit    None  Visit Diagnoses     Left forearm pain    -  Primary    Relevant Orders    XR Forearm 2 View Left (Completed)    Ambulatory Referral to Physical Therapy Evaluate and treat; Stretching, ROM, Strengthening    Closed nondisplaced " fracture of neck of left radius, subsequent encounter        Relevant Orders    Ambulatory Referral to Physical Therapy Evaluate and treat; Stretching, ROM, Strengthening          Follow Up   Discussed the treatment plan with the patient. X-rays were obtained today in the office and reviewed with the patient. Recommend discontinuing the sling and avoid lifting, pushing, or pulling and high impact activity. An order for physical therapy was placed today. Work note with restrictions was provided today. He will call with any concerns or questions.     Return in about 6 weeks (around 3/14/2023) for Recheck with repeat x-rays of left elbow .  Patient is a former smoker.  Encouraged continued tobacco cessation.  Did not discuss options for smoking cessation.    There are no Patient Instructions on file for this visit.  Patient was given instructions and counseling regarding his condition or for health maintenance advice. Please see specific information pulled into the AVS if appropriate.

## 2023-02-01 ENCOUNTER — TREATMENT (OUTPATIENT)
Dept: PHYSICAL THERAPY | Facility: CLINIC | Age: 43
End: 2023-02-01
Payer: COMMERCIAL

## 2023-02-01 DIAGNOSIS — M79.632 LEFT FOREARM PAIN: ICD-10-CM

## 2023-02-01 DIAGNOSIS — S52.125A CLOSED NONDISPLACED FRACTURE OF HEAD OF LEFT RADIUS, INITIAL ENCOUNTER: Primary | ICD-10-CM

## 2023-02-01 DIAGNOSIS — S52.135A CLOSED NONDISPLACED FRACTURE OF NECK OF LEFT RADIUS, INITIAL ENCOUNTER: ICD-10-CM

## 2023-02-01 DIAGNOSIS — M79.632 LEFT FOREARM PAIN: Primary | ICD-10-CM

## 2023-02-01 PROCEDURE — 97165 OT EVAL LOW COMPLEX 30 MIN: CPT | Performed by: PHYSICAL THERAPIST

## 2023-02-01 PROCEDURE — 97110 THERAPEUTIC EXERCISES: CPT | Performed by: PHYSICAL THERAPIST

## 2023-02-01 NOTE — PROGRESS NOTES
Outpatient Occupational Therapy Ortho Initial Evaluation                                 [address] [place] [id number]    Patient: Fernando Verde   : 1980  Diagnosis/ICD-10 Code:  Closed nondisplaced fracture of head of left radius, initial encounter [S52.125A]  Referring practitioner: Lora Jauregui PA-C  Date of Initial Visit: 2023  Today's Date: 2023  Patient seen for 1 sessions               Subjective Questionnaire: QuickDASH:     Past Medical History: hypertension, 5 surgeries on R radial head including radial head replacement x2 and ligament reconstruction    Subjective Evaluation    History of Present Illness  Date of onset: 2022  Mechanism of injury: Pt reports falling on ice in the parking lot at work on 22. Pt sustained L radial head fracture. Pt was in sling until yesterday. Pt works in [employer] at [employer] where he returned to light duty yesterday. Pt is also referred for Occupational Therapy for evaluation and treatment. Pt reports some tightness in his forearm with rotation and can not full extend his L elbow.      Patient Occupation: Sterile processing for [employer]   Precautions and Work Restrictions: light duty 1 lb limit, no pulling/pushingQuality of life: good    Pain  Current pain ratin  At best pain ratin  At worst pain ratin  Location: L elbow  Quality: dull ache  Relieving factors: medications    Social Support  Lives with: spouse and young children    Hand dominance: right    Treatments  Previous treatment: immobilization and medication  Patient Goals  Patient goals for therapy: increased motion, decreased pain and return to work             Objective          Observations     Left Elbow   Positive for edema.       Tenderness     Left Elbow   Tenderness in the cubital tunnel and radial head.     Neurological Testing     Sensation     Elbow   Left Elbow   Intact: light touch    Active Range of Motion   Left  Shoulder   Normal active range of motion    Left Elbow   Flexion: 135 degrees   Extension: -15 degrees   Forearm supination: WFL and with pain  Forearm pronation: WFL and with pain    Left Wrist   Normal active range of motion    Strength/Myotome Testing     Left Wrist/Hand      (2nd hand position)     Trial 1: 20 lbs    Trial 2: 20 lbs    Trial 3: 20 lbs    Average: 20 lbs    Right Wrist/Hand      (2nd hand position)     Trial 1: 60 lbs    Trial 2: 61 lbs    Trial 3: 66 lbs    Average: 62.33 lbs    Additional Strength Details  deferred    Swelling   Left Elbow Girth Measurements   Joint line: 31 cm    Right Elbow Girth Measurements   Joint line: 30 cm          Assessment & Plan     Assessment  Impairments: abnormal coordination, abnormal or restricted ROM, activity intolerance, impaired physical strength and pain with function  Other impairment: unable to lift children, play soccer with children  Functional Limitations: carrying objects, lifting, pulling and pushingPrognosis: good  Prognosis details: Needs to be able to lift 25 lbs for work    Goals  Plan Goals: 1. The patient complains of pain in the L forearm.                  LTG 1: 12 weeks:  The patient will report a pain rating of 2/10 or better in order to improve sleep quality and tolerance to performance of activities of daily living.                                  STATUS:  New                  STG 1a: 6weeks:  The patient will report a pain rating of 5/10 or better at worst.                                   STATUS:  New  2. The patient has limited ROM of the L elbow.                  LTG 2: 12 weeks:  The patient will demonstrate 0/140 degrees of elbow motion to allow the patient to lift his children and wash his back.                                  STATUS:  New                   STG 2a: 6 weeks:  The patient will demonstrate -10 degrees of elbow extension.                                  STATUS:  New                             3. The  patient has limited strength of the L hand.                  LTG 3: 12 weeks:  The patient will demonstrate 50 lbs L  strength in order to open jars/bottles and lift for work.                                  STATUS:  New                  STG 3a: 6 weeks:  The patient will demonstrate 35 lbs L  strength.                                  STATUS:  New  4. Carrying, Moving, and Handling Objects Functional Limitation                                   LTG 4: 12 weeks:  The patient will achieve a score of 11/55 on the Quick DASH.                                  STATUS:  New                  STG 4a: 6 weeks:  The patient will achieve a score of 19/55 on the Quick DASH.                                    STATUS:  New                        Plan  Planned modality interventions: TENS, thermotherapy (hydrocollator packs) and thermotherapy (paraffin bath)  Planned therapy interventions: manual therapy, functional ROM exercises, fine motor coordination training, flexibility, stretching, strengthening, home exercise program, neuromuscular re-education, soft tissue mobilization and therapeutic activities  Frequency: 2x week  Duration in weeks: 12  Treatment plan discussed with: patient          ICD-10-CM ICD-9-CM   1. Closed nondisplaced fracture of head of left radius, initial encounter  S52.125A 813.05   2. Left forearm pain  M79.632 729.5       Patient is indicated for skilled occupational therapy services.    History # of Personal Factors and/or Comorbidities: MODERATE (1-2)  Examination of Body System(s): # of elements: LOW (1-2)  Clinical Presentation: STABLE   Clinical Decision Making: LOW      Evaluation:  Low Complexity:    30     mins  46022;  Mod Complexity:    0     mins  38833;  High Complexity:    0     mins  82666;    Timed:  Manual Therapy:    0     mins  24497;  Therapeutic Exercise:    10     mins  21810;     Timed Treatment:   10   mins   Total Treatment:     40   mins      OT SIGNATURE: Karla Quispe  OTR/L    Electronically signed   License number [id number]   DATE TREATMENT INITIATED: 2/1/2023    Initial Certification  Certification Period: 2/1/2023 thru 5/1/2023  I certify that the therapy services are furnished while this patient is under my care.  The services outlined above are required by this patient, and will be reviewed every 90 days.     PHYSICIAN: Lora Jauregui PA-C  NPI: [id number]                                          DATE:     Please sign and return via fax to  [phone]   Thank you, [facility].

## 2023-02-03 ENCOUNTER — TREATMENT (OUTPATIENT)
Dept: PHYSICAL THERAPY | Facility: CLINIC | Age: 43
End: 2023-02-03
Payer: COMMERCIAL

## 2023-02-03 ENCOUNTER — OFFICE VISIT (OUTPATIENT)
Dept: FAMILY MEDICINE CLINIC | Facility: CLINIC | Age: 43
End: 2023-02-03
Payer: COMMERCIAL

## 2023-02-03 VITALS
HEART RATE: 76 BPM | OXYGEN SATURATION: 98 % | WEIGHT: 283.4 LBS | SYSTOLIC BLOOD PRESSURE: 128 MMHG | BODY MASS INDEX: 38.39 KG/M2 | HEIGHT: 72 IN | TEMPERATURE: 97.3 F | DIASTOLIC BLOOD PRESSURE: 92 MMHG

## 2023-02-03 DIAGNOSIS — M79.632 LEFT FOREARM PAIN: ICD-10-CM

## 2023-02-03 DIAGNOSIS — F41.8 SITUATIONAL ANXIETY: ICD-10-CM

## 2023-02-03 DIAGNOSIS — R73.01 IMPAIRED FASTING BLOOD SUGAR: Primary | ICD-10-CM

## 2023-02-03 DIAGNOSIS — E66.01 CLASS 2 SEVERE OBESITY WITH SERIOUS COMORBIDITY AND BODY MASS INDEX (BMI) OF 38.0 TO 38.9 IN ADULT, UNSPECIFIED OBESITY TYPE: ICD-10-CM

## 2023-02-03 DIAGNOSIS — I10 PRIMARY HYPERTENSION: ICD-10-CM

## 2023-02-03 DIAGNOSIS — S52.125A CLOSED NONDISPLACED FRACTURE OF HEAD OF LEFT RADIUS, INITIAL ENCOUNTER: Primary | ICD-10-CM

## 2023-02-03 DIAGNOSIS — R53.83 FATIGUE, UNSPECIFIED TYPE: ICD-10-CM

## 2023-02-03 DIAGNOSIS — R06.83 SNORING: ICD-10-CM

## 2023-02-03 DIAGNOSIS — E78.2 MIXED HYPERLIPIDEMIA: ICD-10-CM

## 2023-02-03 LAB
EXPIRATION DATE: NORMAL
HBA1C MFR BLD: 5.7 %
Lab: NORMAL

## 2023-02-03 PROCEDURE — 83036 HEMOGLOBIN GLYCOSYLATED A1C: CPT | Performed by: NURSE PRACTITIONER

## 2023-02-03 PROCEDURE — 99214 OFFICE O/P EST MOD 30 MIN: CPT | Performed by: NURSE PRACTITIONER

## 2023-02-03 PROCEDURE — 97110 THERAPEUTIC EXERCISES: CPT | Performed by: PHYSICAL THERAPIST

## 2023-02-03 PROCEDURE — 97530 THERAPEUTIC ACTIVITIES: CPT | Performed by: PHYSICAL THERAPIST

## 2023-02-03 PROCEDURE — 97112 NEUROMUSCULAR REEDUCATION: CPT | Performed by: PHYSICAL THERAPIST

## 2023-02-03 RX ORDER — ATORVASTATIN CALCIUM 20 MG/1
20 TABLET, FILM COATED ORAL DAILY
Qty: 90 TABLET | Refills: 1 | Status: SHIPPED | OUTPATIENT
Start: 2023-02-03

## 2023-02-03 RX ORDER — LOSARTAN POTASSIUM AND HYDROCHLOROTHIAZIDE 25; 100 MG/1; MG/1
1 TABLET ORAL DAILY
Qty: 90 TABLET | Refills: 1 | Status: SHIPPED | OUTPATIENT
Start: 2023-02-03

## 2023-02-03 RX ORDER — ESCITALOPRAM OXALATE 20 MG/1
20 TABLET ORAL DAILY
Qty: 90 TABLET | Refills: 1 | Status: SHIPPED | OUTPATIENT
Start: 2023-02-03

## 2023-02-03 NOTE — PROGRESS NOTES
"Chief Complaint  Hypertension (6 MO F/U)    Subjective         Fernando Verde presents to [facility]  Patient presents to the office today for 6-month follow-up regarding his hypertension.  Blood pressure arrival today is 128/92.  Nuys any chest pain shortness breath palpitations this time.  He does state that he has not taken his medication today.  He does state that he will be needing refills on his medications.  Patient also states that he will be doing his lab work first of next week.  Patient does request a referral for sleep study.  He states that he has been having increased fatigue and snoring throughout the night.  Patient also states that he would like a complete consultation with a bariatric surgeon just to discuss options as well as outcomes.  Patient's A1c was 6.1 at [facility].  We did recheck this in the office which was 5.9%.    Hypertension         Objective     Vitals:    02/03/23 1328   BP: 128/92   BP Location: Left arm   Patient Position: Sitting   Cuff Size: Large Adult   Pulse: 76   Temp: 97.3 °F (36.3 °C)   TempSrc: Temporal   SpO2: 98%   Weight: 129 kg (283 lb 6.4 oz)   Height: 182.9 cm (72\")      Body mass index is 38.44 kg/m².            Physical Exam  Vitals reviewed.   Constitutional:       Appearance: Normal appearance.   Cardiovascular:      Rate and Rhythm: Normal rate and regular rhythm.      Pulses: Normal pulses.      Heart sounds: Normal heart sounds, S1 normal and S2 normal. No murmur heard.  Pulmonary:      Effort: Pulmonary effort is normal. No respiratory distress.      Breath sounds: Normal breath sounds.   Skin:     General: Skin is warm and dry.   Neurological:      Mental Status: He is alert and oriented to person, place, and time.   Psychiatric:         Attention and Perception: Attention normal.         Mood and Affect: Mood normal.         Behavior: Behavior normal.          Result Review :   The following data was reviewed " by: DEVIN Weaver on 02/03/2023:      Procedures    Assessment and Plan   Diagnoses and all orders for this visit:    1. Impaired fasting blood sugar (Primary)  -     POC Glycosylated Hemoglobin (Hb A1C)    2. Mixed hyperlipidemia  -     atorvastatin (LIPITOR) 20 MG tablet; Take 1 tablet by mouth Daily.  Dispense: 90 tablet; Refill: 1    3. Situational anxiety  -     escitalopram (Lexapro) 20 MG tablet; Take 1 tablet by mouth Daily.  Dispense: 90 tablet; Refill: 1    4. Primary hypertension  -     losartan-hydrochlorothiazide (Hyzaar) 100-25 MG per tablet; Take 1 tablet by mouth Daily.  Dispense: 90 tablet; Refill: 1    5. Snoring  -     Ambulatory Referral to Sleep Medicine    6. Fatigue, unspecified type  -     Ambulatory Referral to Sleep Medicine    7. Class 2 severe obesity with serious comorbidity and body mass index (BMI) of 38.0 to 38.9 in adult, unspecified obesity type (HCC)  -     Ambulatory Referral to Bariatric Surgery          Follow Up   Return in about 6 months (around 8/3/2023) for Recheck.  Patient was given instructions and counseling regarding his condition or for health maintenance advice. Please see specific information pulled into the AVS if appropriate.

## 2023-02-03 NOTE — PROGRESS NOTES
Occupational Therapy Daily Treatment Note  [address] [place] [address]      Patient: Fernando Verde   : 1980  Referring practitioner: No ref. provider found  Date of Initial Visit: Type: THERAPY  Noted: 2023  Today's Date: 2/3/2023  Patient seen for 2 sessions         Fernando Verde reports: it is not too bad today, just still little sore.        Subjective Evaluation    Pain  Current pain ratin  Location: L forearm           Objective   See Exercise, Manual, and Modality Logs for complete treatment.       Assessment/Plan    Visit Diagnoses:    ICD-10-CM ICD-9-CM   1. Closed nondisplaced fracture of head of left radius, initial encounter  S52.125A 813.05   2. Left forearm pain  M79.632 729.5     Pt tolerated treatment well. Continue per POC for range of motion and strengthening.         Timed:  Manual Therapy:    0     mins  64931;  Therapeutic Exercise:    10     mins  99542;     Therapeutic Activity:    10     mins  35149;  Ultrasound:     0     mins  85067;    Electrical Stimulation:    0     mins 98022;  Neuromuscular Lon:    10    mins  50901;    Timed Treatment:   30   mins   Total Treatment:     30   min    DAIN Rivera/L  Occupational Therapist    Electronically signed   License number [id number]

## 2023-02-08 ENCOUNTER — TREATMENT (OUTPATIENT)
Dept: PHYSICAL THERAPY | Facility: CLINIC | Age: 43
End: 2023-02-08
Payer: COMMERCIAL

## 2023-02-08 DIAGNOSIS — M79.632 LEFT FOREARM PAIN: ICD-10-CM

## 2023-02-08 DIAGNOSIS — S52.125A CLOSED NONDISPLACED FRACTURE OF HEAD OF LEFT RADIUS, INITIAL ENCOUNTER: Primary | ICD-10-CM

## 2023-02-08 PROCEDURE — 97110 THERAPEUTIC EXERCISES: CPT | Performed by: PHYSICAL THERAPIST

## 2023-02-08 PROCEDURE — 97530 THERAPEUTIC ACTIVITIES: CPT | Performed by: PHYSICAL THERAPIST

## 2023-02-08 PROCEDURE — 97112 NEUROMUSCULAR REEDUCATION: CPT | Performed by: PHYSICAL THERAPIST

## 2023-02-08 NOTE — PROGRESS NOTES
Occupational Therapy Daily Treatment Note  [address] [place] [address]      Patient: Fernando Verde   : 1980  Referring practitioner: Lora Jauregui PA-C  Date of Initial Visit: Type: THERAPY  Noted: 2023  Today's Date: 2023  Patient seen for 3 sessions         Fernando Verde reports: I have been using it a lot today.        Subjective Evaluation    Pain  Current pain ratin (with movement)           Objective          Observations     Left Elbow   Positive for edema.       Tenderness     Left Elbow   Tenderness in the cubital tunnel and radial head.     Neurological Testing     Sensation     Elbow   Left Elbow   Intact: light touch    Active Range of Motion   Left Shoulder   Normal active range of motion    Left Elbow   Flexion: 145 degrees   Extension: -12 degrees   Forearm supination: WFL and with pain  Forearm pronation: WFL and with pain    Left Wrist   Normal active range of motion    Strength/Myotome Testing     Left Wrist/Hand      (2nd hand position)     Trial 1: 20 lbs    Trial 2: 20 lbs    Trial 3: 20 lbs    Average: 20 lbs    Right Wrist/Hand      (2nd hand position)     Trial 1: 60 lbs    Trial 2: 61 lbs    Trial 3: 66 lbs    Average: 62.33 lbs    Additional Strength Details  deferred    Swelling   Left Elbow Girth Measurements   Joint line: 31 cm    Right Elbow Girth Measurements   Joint line: 30 cm      See Exercise, Manual, and Modality Logs for complete treatment.       Assessment/Plan    Visit Diagnoses:    ICD-10-CM ICD-9-CM   1. Closed nondisplaced fracture of head of left radius, initial encounter  S52.125A 813.05   2. Left forearm pain  M79.632 729.5       Continue per POC for range of motion and light strengthening.         Timed:  Manual Therapy:    0     mins  16294;  Therapeutic Exercise:    10     mins  81844;     Therapeutic Activity:    10     mins  32322;  Ultrasound:     0     mins  98592;    Electrical Stimulation:    0     mins  45876;  Neuromuscular Lon:    8    mins  88086;      Timed Treatment:   28   mins   Total Treatment:     28   min    Karla Quispe OTR/L  Occupational Therapist    Electronically signed   License number [id number]

## 2023-02-10 ENCOUNTER — TREATMENT (OUTPATIENT)
Dept: PHYSICAL THERAPY | Facility: CLINIC | Age: 43
End: 2023-02-10
Payer: COMMERCIAL

## 2023-02-10 DIAGNOSIS — M79.632 LEFT FOREARM PAIN: ICD-10-CM

## 2023-02-10 DIAGNOSIS — S52.125A CLOSED NONDISPLACED FRACTURE OF HEAD OF LEFT RADIUS, INITIAL ENCOUNTER: Primary | ICD-10-CM

## 2023-02-10 PROCEDURE — 97530 THERAPEUTIC ACTIVITIES: CPT | Performed by: PHYSICAL THERAPIST

## 2023-02-10 PROCEDURE — 97112 NEUROMUSCULAR REEDUCATION: CPT | Performed by: PHYSICAL THERAPIST

## 2023-02-10 PROCEDURE — 97110 THERAPEUTIC EXERCISES: CPT | Performed by: PHYSICAL THERAPIST

## 2023-02-10 NOTE — PROGRESS NOTES
Occupational Therapy Daily Treatment Note  [address] [place] [address]      Patient: Fernando Verde   : 1980  Referring practitioner: Lora Jauregui PA-C  Date of Initial Visit: Type: THERAPY  Noted: 2023  Today's Date: 2/10/2023  Patient seen for 4 sessions         Fernando Verde reports: its about the same.        Subjective Evaluation    Pain  Current pain ratin           Objective   See Exercise, Manual, and Modality Logs for complete treatment.       Assessment/Plan    Visit Diagnoses:    ICD-10-CM ICD-9-CM   1. Closed nondisplaced fracture of head of left radius, initial encounter  S52.125A 813.05   2. Left forearm pain  M79.632 729.5     Pt tolerated treatment well. Continue per POC         Timed:  Manual Therapy:    0     mins  45900;  Therapeutic Exercise:    10     mins  81416;     Therapeutic Activity:    10     mins  72424;  Ultrasound:     0     mins  80880;    Electrical Stimulation:    0     mins 96082;  Neuromuscular Lon:    10    mins  68841;      Timed Treatment:   30   mins   Total Treatment:     30   min    DAIN Rivera/L  Occupational Therapist    Electronically signed   License number [id number]

## 2023-02-11 ENCOUNTER — LAB (OUTPATIENT)
Dept: LAB | Facility: HOSPITAL | Age: 43
End: 2023-02-11
Payer: COMMERCIAL

## 2023-02-11 DIAGNOSIS — I10 PRIMARY HYPERTENSION: ICD-10-CM

## 2023-02-11 DIAGNOSIS — E78.2 MIXED HYPERLIPIDEMIA: ICD-10-CM

## 2023-02-11 LAB
ALBUMIN SERPL-MCNC: 4.4 G/DL (ref 3.5–5.2)
ALBUMIN/GLOB SERPL: 1.4 G/DL
ALP SERPL-CCNC: 111 U/L (ref 39–117)
ALT SERPL W P-5'-P-CCNC: 34 U/L (ref 1–41)
ANION GAP SERPL CALCULATED.3IONS-SCNC: 9 MMOL/L (ref 5–15)
AST SERPL-CCNC: 22 U/L (ref 1–40)
BILIRUB SERPL-MCNC: 0.4 MG/DL (ref 0–1.2)
BUN SERPL-MCNC: 17 MG/DL (ref 6–20)
BUN/CREAT SERPL: 18.5 (ref 7–25)
CALCIUM SPEC-SCNC: 9.6 MG/DL (ref 8.6–10.5)
CHLORIDE SERPL-SCNC: 102 MMOL/L (ref 98–107)
CHOLEST SERPL-MCNC: 194 MG/DL (ref 0–200)
CO2 SERPL-SCNC: 29 MMOL/L (ref 22–29)
CREAT SERPL-MCNC: 0.92 MG/DL (ref 0.76–1.27)
DEPRECATED RDW RBC AUTO: 37.3 FL (ref 37–54)
EGFRCR SERPLBLD CKD-EPI 2021: 106.5 ML/MIN/1.73
ERYTHROCYTE [DISTWIDTH] IN BLOOD BY AUTOMATED COUNT: 12.2 % (ref 12.3–15.4)
GLOBULIN UR ELPH-MCNC: 3.2 GM/DL
GLUCOSE SERPL-MCNC: 103 MG/DL (ref 65–99)
HCT VFR BLD AUTO: 42.8 % (ref 37.5–51)
HDLC SERPL-MCNC: 45 MG/DL (ref 40–60)
HGB BLD-MCNC: 14.8 G/DL (ref 13–17.7)
LDLC SERPL CALC-MCNC: 116 MG/DL (ref 0–100)
LDLC/HDLC SERPL: 2.47 {RATIO}
MCH RBC QN AUTO: 28.8 PG (ref 26.6–33)
MCHC RBC AUTO-ENTMCNC: 34.6 G/DL (ref 31.5–35.7)
MCV RBC AUTO: 83.3 FL (ref 79–97)
PLATELET # BLD AUTO: 293 10*3/MM3 (ref 140–450)
PMV BLD AUTO: 10.4 FL (ref 6–12)
POTASSIUM SERPL-SCNC: 3.9 MMOL/L (ref 3.5–5.2)
PROT SERPL-MCNC: 7.6 G/DL (ref 6–8.5)
RBC # BLD AUTO: 5.14 10*6/MM3 (ref 4.14–5.8)
SODIUM SERPL-SCNC: 140 MMOL/L (ref 136–145)
TRIGL SERPL-MCNC: 190 MG/DL (ref 0–150)
VLDLC SERPL-MCNC: 33 MG/DL (ref 5–40)
WBC NRBC COR # BLD: 10.18 10*3/MM3 (ref 3.4–10.8)

## 2023-02-11 PROCEDURE — 80061 LIPID PANEL: CPT

## 2023-02-11 PROCEDURE — 80053 COMPREHEN METABOLIC PANEL: CPT

## 2023-02-11 PROCEDURE — 85027 COMPLETE CBC AUTOMATED: CPT

## 2023-02-11 PROCEDURE — 36415 COLL VENOUS BLD VENIPUNCTURE: CPT

## 2023-02-15 ENCOUNTER — TREATMENT (OUTPATIENT)
Dept: PHYSICAL THERAPY | Facility: CLINIC | Age: 43
End: 2023-02-15
Payer: COMMERCIAL

## 2023-02-15 DIAGNOSIS — M79.632 LEFT FOREARM PAIN: ICD-10-CM

## 2023-02-15 DIAGNOSIS — S52.125A CLOSED NONDISPLACED FRACTURE OF HEAD OF LEFT RADIUS, INITIAL ENCOUNTER: Primary | ICD-10-CM

## 2023-02-15 PROCEDURE — 97112 NEUROMUSCULAR REEDUCATION: CPT | Performed by: PHYSICAL THERAPIST

## 2023-02-15 PROCEDURE — 97530 THERAPEUTIC ACTIVITIES: CPT | Performed by: PHYSICAL THERAPIST

## 2023-02-15 PROCEDURE — 97110 THERAPEUTIC EXERCISES: CPT | Performed by: PHYSICAL THERAPIST

## 2023-02-15 NOTE — PROGRESS NOTES
Occupational Therapy Daily Treatment Note  [address] [place] [address]      Patient: Fernando Verde   : 1980  Referring practitioner: Lora Jauregui PA-C  Date of Initial Visit: Type: THERAPY  Noted: 2023  Today's Date: 2/15/2023  Patient seen for 5 sessions         Fernando Verde reports: it is doing better.         Subjective     Objective          Observations     Left Elbow   Positive for edema.       Tenderness     Left Elbow   Tenderness in the cubital tunnel and radial head.     Neurological Testing     Sensation     Elbow   Left Elbow   Intact: light touch    Active Range of Motion   Left Shoulder   Normal active range of motion    Left Elbow   Flexion: 145 degrees   Extension: -7 degrees   Forearm supination: WFL and with pain  Forearm pronation: WFL and with pain    Left Wrist   Normal active range of motion    Strength/Myotome Testing     Left Wrist/Hand      (2nd hand position)     Trial 1: 35 lbs    Trial 2: 35 lbs    Trial 3: 41 lbs    Average: 37 lbs    Right Wrist/Hand      (2nd hand position)     Trial 1: 60 lbs    Trial 2: 61 lbs    Trial 3: 66 lbs    Average: 62.33 lbs    Additional Strength Details  deferred    Swelling   Left Elbow Girth Measurements   Joint line: 31 cm    Right Elbow Girth Measurements   Joint line: 30 cm      See Exercise, Manual, and Modality Logs for complete treatment.       Assessment/Plan    Visit Diagnoses:    ICD-10-CM ICD-9-CM   1. Closed nondisplaced fracture of head of left radius, initial encounter  S52.125A 813.05   2. Left forearm pain  M79.632 729.5     Pt gained elbow extension and  strength today. Continue per POC         Timed:  Manual Therapy:    0     mins  73496;  Therapeutic Exercise:    10     mins  20643;     Therapeutic Activity:    10     mins  83555;  Ultrasound:     0     mins  09847;    Electrical Stimulation:    0     mins 76037;  Neuromuscular Lon:    10    mins  90006;      Timed Treatment:   30   mins   Total  Treatment:     30   min    Karla Quispe OTR/L  Occupational Therapist    Electronically signed   License number [id number]

## 2023-02-17 ENCOUNTER — TREATMENT (OUTPATIENT)
Dept: PHYSICAL THERAPY | Facility: CLINIC | Age: 43
End: 2023-02-17
Payer: COMMERCIAL

## 2023-02-17 DIAGNOSIS — S52.125A CLOSED NONDISPLACED FRACTURE OF HEAD OF LEFT RADIUS, INITIAL ENCOUNTER: Primary | ICD-10-CM

## 2023-02-17 DIAGNOSIS — M79.632 LEFT FOREARM PAIN: ICD-10-CM

## 2023-02-17 PROCEDURE — 97530 THERAPEUTIC ACTIVITIES: CPT | Performed by: PHYSICAL THERAPIST

## 2023-02-17 PROCEDURE — 97112 NEUROMUSCULAR REEDUCATION: CPT | Performed by: PHYSICAL THERAPIST

## 2023-02-17 PROCEDURE — 97110 THERAPEUTIC EXERCISES: CPT | Performed by: PHYSICAL THERAPIST

## 2023-02-17 NOTE — PROGRESS NOTES
Occupational Therapy Daily Treatment Note  [address] [place] [address]      Patient: Fernando Verde   : 1980  Referring practitioner: Lora Jauregui PA-C  Date of Initial Visit: Type: THERAPY  Noted: 2023  Today's Date: 2023  Patient seen for 6 sessions         Fernando Verde reports: no pain this morning.        Subjective     Objective   See Exercise, Manual, and Modality Logs for complete treatment.       Assessment/Plan    Visit Diagnoses:    ICD-10-CM ICD-9-CM   1. Closed nondisplaced fracture of head of left radius, initial encounter  S52.125A 813.05   2. Left forearm pain  M79.632 729.5     Pt tolerated treatment well and had no pain today. Continue per POC         Timed:  Manual Therapy:    0     mins  52041;  Therapeutic Exercise:    10     mins  66878;     Therapeutic Activity:    10     mins  37021;  Ultrasound:     0     mins  07163;    Electrical Stimulation:    0     mins 50653;  Neuromuscular Lon:    10    mins  69303;      Timed Treatment:   30   mins   Total Treatment:     30   min    DAIN Rivera/L  Occupational Therapist    Electronically signed   License number [id number]

## 2023-02-22 ENCOUNTER — TREATMENT (OUTPATIENT)
Dept: PHYSICAL THERAPY | Facility: CLINIC | Age: 43
End: 2023-02-22
Payer: COMMERCIAL

## 2023-02-22 DIAGNOSIS — S52.125A CLOSED NONDISPLACED FRACTURE OF HEAD OF LEFT RADIUS, INITIAL ENCOUNTER: Primary | ICD-10-CM

## 2023-02-22 DIAGNOSIS — M79.632 LEFT FOREARM PAIN: ICD-10-CM

## 2023-02-22 PROCEDURE — 97530 THERAPEUTIC ACTIVITIES: CPT | Performed by: PHYSICAL THERAPIST

## 2023-02-22 PROCEDURE — 97112 NEUROMUSCULAR REEDUCATION: CPT | Performed by: PHYSICAL THERAPIST

## 2023-02-22 PROCEDURE — 97110 THERAPEUTIC EXERCISES: CPT | Performed by: PHYSICAL THERAPIST

## 2023-02-22 NOTE — PROGRESS NOTES
Occupational Therapy Daily Treatment Note  [address] [place] [address]      Patient: Fernando Verde   : 1980  Referring practitioner: Lora Jauregui PA-C  Date of Initial Visit: Type: THERAPY  Noted: 2023  Today's Date: 2023  Patient seen for 7 sessions         Fernando Verde reports: it was sore yesterday.        Subjective Evaluation    Pain  Current pain ratin  Location: L elbow           Objective          Tenderness     Left Elbow   Tenderness in the cubital tunnel and radial head.     Neurological Testing     Sensation     Elbow   Left Elbow   Intact: light touch    Active Range of Motion   Left Shoulder   Normal active range of motion    Left Elbow   Flexion: 145 degrees   Extension: -5 degrees   Forearm supination: WFL and with pain  Forearm pronation: WFL and with pain    Left Wrist   Normal active range of motion    Strength/Myotome Testing     Left Wrist/Hand      (2nd hand position)     Trial 1: 35 lbs    Trial 2: 40 lbs    Trial 3: 47 lbs    Average: 40.67 lbs    Right Wrist/Hand      (2nd hand position)     Trial 1: 60 lbs    Trial 2: 61 lbs    Trial 3: 66 lbs    Average: 62.33 lbs    Additional Strength Details  deferred    Swelling   Left Elbow Girth Measurements   Joint line: 31 cm    Right Elbow Girth Measurements   Joint line: 30 cm      See Exercise, Manual, and Modality Logs for complete treatment.       Assessment/Plan    Visit Diagnoses:    ICD-10-CM ICD-9-CM   1. Closed nondisplaced fracture of head of left radius, initial encounter  S52.125A 813.05   2. Left forearm pain  M79.632 729.5     Pt had increased  strength today. Continue per POC for range of motion and strengthening.         Timed:  Manual Therapy:    0     mins  91511;  Therapeutic Exercise:    10     mins  61133;     Therapeutic Activity:    10     mins  68178;  Ultrasound:     0     mins  46885;    Electrical Stimulation:    0     mins 99622;  Neuromuscular Lon:    10    mins   49805;      Timed Treatment:   30   mins   Total Treatment:     30   min    Karla Quispe OTR/L  Occupational Therapist    Electronically signed   License number [id number]

## 2023-02-24 ENCOUNTER — TREATMENT (OUTPATIENT)
Dept: PHYSICAL THERAPY | Facility: CLINIC | Age: 43
End: 2023-02-24
Payer: COMMERCIAL

## 2023-02-24 DIAGNOSIS — S52.125A CLOSED NONDISPLACED FRACTURE OF HEAD OF LEFT RADIUS, INITIAL ENCOUNTER: Primary | ICD-10-CM

## 2023-02-24 DIAGNOSIS — M79.632 LEFT FOREARM PAIN: ICD-10-CM

## 2023-02-24 PROCEDURE — 97110 THERAPEUTIC EXERCISES: CPT | Performed by: PHYSICAL THERAPIST

## 2023-02-24 PROCEDURE — 97530 THERAPEUTIC ACTIVITIES: CPT | Performed by: PHYSICAL THERAPIST

## 2023-02-24 PROCEDURE — 97112 NEUROMUSCULAR REEDUCATION: CPT | Performed by: PHYSICAL THERAPIST

## 2023-02-24 NOTE — PROGRESS NOTES
Occupational Therapy Daily Treatment Note  [address] [place] [id number]      Patient: Fernando Verde   : 1980  Referring practitioner: Lora Jauregui PA-C  Date of Initial Visit: Type: THERAPY  Noted: 2023  Today's Date: 2023  Patient seen for 8 sessions         Fernando Verde reports: feeling pretty good today.        Subjective     Objective   See Exercise, Manual, and Modality Logs for complete treatment.     Resisted elbow flexion added today and carrying.  Assessment/Plan    Visit Diagnoses:    ICD-10-CM ICD-9-CM   1. Closed nondisplaced fracture of head of left radius, initial encounter  S52.125A 813.05   2. Left forearm pain  M79.632 729.5       Continue per POC         Timed:  Manual Therapy:    0     mins  32848;  Therapeutic Exercise:    10     mins  60710;     Therapeutic Activity:    10     mins  12277;  Ultrasound:     0     mins  81407;    Electrical Stimulation:    0     mins 05940;  Neuromuscular Lon:    10    mins  26079;    Timed Treatment:   30   mins   Total Treatment:     30   min    DAIN Rivera/L  Occupational Therapist    Electronically signed   License number [id number]

## 2023-03-01 ENCOUNTER — TREATMENT (OUTPATIENT)
Dept: PHYSICAL THERAPY | Facility: CLINIC | Age: 43
End: 2023-03-01
Payer: COMMERCIAL

## 2023-03-01 DIAGNOSIS — S52.125A CLOSED NONDISPLACED FRACTURE OF HEAD OF LEFT RADIUS, INITIAL ENCOUNTER: Primary | ICD-10-CM

## 2023-03-01 DIAGNOSIS — M79.632 LEFT FOREARM PAIN: ICD-10-CM

## 2023-03-01 PROCEDURE — 97530 THERAPEUTIC ACTIVITIES: CPT | Performed by: PHYSICAL THERAPIST

## 2023-03-01 PROCEDURE — 97112 NEUROMUSCULAR REEDUCATION: CPT | Performed by: PHYSICAL THERAPIST

## 2023-03-01 PROCEDURE — 97110 THERAPEUTIC EXERCISES: CPT | Performed by: PHYSICAL THERAPIST

## 2023-03-01 NOTE — PROGRESS NOTES
Progress Note                                       [address] [place] [address]        Patient: Fernando Verde   : 1980  Diagnosis/ICD-10 Code:  Closed nondisplaced fracture of head of left radius, initial encounter [S52.125A]  Referring practitioner: Lora Jauregui PA-C  Date of Initial Visit: Type: THERAPY  Noted: 2023  Today's Date: 3/1/2023  Patient seen for 9 sessions      Subjective:   Fernando Verde reports: I hadn't taken any medication yet today.  Clinical Progress: improved  Home Program Compliance: Yes  Treatment has included: therapeutic exercise, neuromuscular re-education and therapeutic activity    Subjective Evaluation    History of Present Illness      Precautions and Work Restrictions: 1 lb weight limit for workPain  Current pain ratin  Location: L elbow         Objective          Tenderness     Left Elbow   No tenderness in the cubital tunnel and radial head.     Neurological Testing     Sensation     Elbow   Left Elbow   Intact: light touch    Active Range of Motion   Left Shoulder   Normal active range of motion    Left Elbow   Flexion: WFL  Extension: WFL  Forearm supination: WFL and with pain  Forearm pronation: WFL and with pain    Left Wrist   Normal active range of motion    Strength/Myotome Testing     Left Elbow   Flexion: 4-  Extension: 4    Left Wrist/Hand   Wrist extension: 4-  Wrist flexion: 4-     (2nd hand position)     Trial 1: 41 lbs    Trial 2: 43 lbs    Trial 3: 50 lbs    Average: 44.67 lbs    Right Wrist/Hand      (2nd hand position)     Trial 1: 60 lbs    Trial 2: 61 lbs    Trial 3: 66 lbs    Average: 62.33 lbs      Assessment & Plan     Assessment  Impairments: abnormal coordination, abnormal or restricted ROM, activity intolerance, impaired physical strength and pain with function  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 The patient complains of pain in the L forearm.                  LTG 1: 12 weeks:  The patient will report a pain rating of 2/10 or better in order to improve sleep quality and tolerance to performance of activities of daily living.                                  STATUS:  Not met                  STG 1a: 6weeks:  The patient will report a pain rating of 5/10 or better at worst.                                   STATUS:  Met  2. The patient has limited ROM of the L elbow.                  LTG 2: 12 weeks:  The patient will demonstrate 0/140 degrees of elbow motion to allow the patient to lift his children and wash his back.                                  STATUS:  Met                   STG 2a: 6 weeks:  The patient will demonstrate -10 degrees of elbow extension.                                  STATUS:Met                            3. The patient has limited strength of the L hand.                  LTG 3: 12 weeks:  The patient will demonstrate 50 lbs L  strength in order to open jars/bottles and lift for work.                                  STATUS:  Not met                  STG 3a: 6 weeks:  The patient will demonstrate 35 lbs L  strength.                                  STATUS:  Met                 STG 3b: 6 weeks: The patient will demonstrate MMT 5/5/                                  STATUS: New  4. Carrying, Moving, and Handling Objects Functional Limitation                                   LTG 4: 12 weeks:  The patient will achieve a score of 11/55 on the Quick DASH.                                  STATUS:  New                  STG 4a: 6 weeks:  The patient will achieve a score of 19/55 on the Quick DASH.                                    STATUS:  New                        Plan  Planned modality interventions: TENS, thermotherapy (hydrocollator packs) and thermotherapy (paraffin bath)  Planned therapy  interventions: manual therapy, functional ROM exercises, fine motor coordination training, flexibility, stretching, strengthening, home exercise program, neuromuscular re-education, soft tissue mobilization and therapeutic activities  Frequency: 2x week  Duration in weeks: 8  Treatment plan discussed with: patient        Visit Diagnoses:    ICD-10-CM ICD-9-CM   1. Closed nondisplaced fracture of head of left radius, initial encounter  S52.125A 813.05   2. Left forearm pain  M79.632 729.5       Progress toward previous goals: Partially Met       Recommendations: Continue as planned  Timeframe: 2 months  Prognosis to achieve goals: good    OT Signature: DAIN Rivera/ELISHA    Electronically signed   License number [id number]          Timed:  Manual Therapy:    0     mins  67574;  Therapeutic Exercise:    15     mins  98264;  Therapeutic Activity:    10     mins  14854;  Neuromuscular Lon:    10    mins  78503;      Timed Treatment:   35   mins   Total Treatment:     35   mins

## 2023-03-03 ENCOUNTER — TELEPHONE (OUTPATIENT)
Dept: PHYSICAL THERAPY | Facility: CLINIC | Age: 43
End: 2023-03-03

## 2023-03-08 ENCOUNTER — TREATMENT (OUTPATIENT)
Dept: PHYSICAL THERAPY | Facility: CLINIC | Age: 43
End: 2023-03-08
Payer: COMMERCIAL

## 2023-03-08 DIAGNOSIS — S52.125D CLOSED NONDISPLACED FRACTURE OF HEAD OF LEFT RADIUS WITH ROUTINE HEALING, SUBSEQUENT ENCOUNTER: Primary | ICD-10-CM

## 2023-03-08 DIAGNOSIS — M79.632 LEFT FOREARM PAIN: ICD-10-CM

## 2023-03-08 PROCEDURE — 97112 NEUROMUSCULAR REEDUCATION: CPT | Performed by: PHYSICAL THERAPIST

## 2023-03-08 PROCEDURE — 97530 THERAPEUTIC ACTIVITIES: CPT | Performed by: PHYSICAL THERAPIST

## 2023-03-08 PROCEDURE — 97110 THERAPEUTIC EXERCISES: CPT | Performed by: PHYSICAL THERAPIST

## 2023-03-08 NOTE — PROGRESS NOTES
Occupational Therapy Daily Treatment Note  [address] [place] [address]      Patient: Fernando Verde   : 1980  Referring practitioner: Lora Jauregui PA-C  Date of Initial Visit: Type: THERAPY  Noted: 2023  Today's Date: 3/8/2023  Patient seen for 10 sessions         Fernando Verde reports: I have been doing more with it and it is not hurting.        Subjective Evaluation    Pain  Current pain ratin  At worst pain ratin (with bicep curls)  Location: L elbow           Objective          Tenderness     Left Elbow   No tenderness in the cubital tunnel and radial head.     Neurological Testing     Sensation     Elbow   Left Elbow   Intact: light touch    Active Range of Motion   Left Shoulder   Normal active range of motion    Left Elbow   Flexion: WFL  Extension: WFL  Forearm supination: WFL and with pain  Forearm pronation: WFL and with pain    Left Wrist   Normal active range of motion    Strength/Myotome Testing     Left Elbow   Flexion: 4-  Extension: 4    Left Wrist/Hand   Wrist extension: 4-  Wrist flexion: 4-     (2nd hand position)     Trial 1: 60 lbs    Trial 2: 53 lbs    Trial 3: 61 lbs    Average: 58 lbs    Right Wrist/Hand      (2nd hand position)     Trial 1: 60 lbs    Trial 2: 61 lbs    Trial 3: 66 lbs    Average: 62.33 lbs      See Exercise, Manual, and Modality Logs for complete treatment.     Pt did have increased pain with bicep curls.  Assessment/Plan    Visit Diagnoses:    ICD-10-CM ICD-9-CM   1. Closed nondisplaced fracture of head of left radius, initial encounter  S52.125A 813.05   2. Left forearm pain  M79.632 729.5     Pt had decreased pain today and significant increase in  strength. Continue per POC         Timed:  Manual Therapy:    0     mins  43541;  Therapeutic Exercise:    15     mins  23045;     Therapeutic Activity:    8     mins  00391;  Ultrasound:     0     mins  32589;    Electrical Stimulation:    0     mins 01492;  Neuromuscular Lon:     8    mins  19840;      Timed Treatment:   31   mins   Total Treatment:     31   min    Karla Quispe OTR/L  Occupational Therapist    Electronically signed   License number [id number]

## 2023-03-10 ENCOUNTER — TREATMENT (OUTPATIENT)
Dept: PHYSICAL THERAPY | Facility: CLINIC | Age: 43
End: 2023-03-10
Payer: COMMERCIAL

## 2023-03-10 DIAGNOSIS — M79.632 LEFT FOREARM PAIN: ICD-10-CM

## 2023-03-10 DIAGNOSIS — S52.125D CLOSED NONDISPLACED FRACTURE OF HEAD OF LEFT RADIUS WITH ROUTINE HEALING, SUBSEQUENT ENCOUNTER: Primary | ICD-10-CM

## 2023-03-10 PROCEDURE — 97530 THERAPEUTIC ACTIVITIES: CPT | Performed by: PHYSICAL THERAPIST

## 2023-03-10 PROCEDURE — 97112 NEUROMUSCULAR REEDUCATION: CPT | Performed by: PHYSICAL THERAPIST

## 2023-03-10 PROCEDURE — 97110 THERAPEUTIC EXERCISES: CPT | Performed by: PHYSICAL THERAPIST

## 2023-03-10 NOTE — PROGRESS NOTES
Occupational Therapy Daily Treatment Note  [address] [place] [address]      Patient: Fernando Verde   : 1980  Referring practitioner: Lora Jauregui PA-C  Date of Initial Visit: Type: THERAPY  Noted: 2023  Today's Date: 3/10/2023  Patient seen for 11 sessions         Fernando Verde reports: no pain today.        Subjective     Objective   See Exercise, Manual, and Modality Logs for complete treatment.   Chest press and rows added today with no pain in the elbow.  Biceps curls much easier today and not painful.  Assessment/Plan    Visit Diagnoses:    ICD-10-CM ICD-9-CM   1. Closed nondisplaced fracture of head of left radius with routine healing, subsequent encounter  S52.125D V54.12   2. Left forearm pain  M79.632 729.5       Continue per POC for strengthening.         Timed:  Manual Therapy:    0     mins  55561;  Therapeutic Exercise:    10     mins  65978;     Therapeutic Activity:    10     mins  66943;  Ultrasound:     0     mins  15519;    Electrical Stimulation:    0     mins 62944;  Neuromuscular Lon:    8    mins  74471;      Timed Treatment:   28   mins   Total Treatment:     28   min    Karla Quispe OTR/L  Occupational Therapist    Electronically signed   License number [id number]

## 2023-03-13 ENCOUNTER — OFFICE VISIT (OUTPATIENT)
Dept: FAMILY MEDICINE CLINIC | Facility: CLINIC | Age: 43
End: 2023-03-13
Payer: COMMERCIAL

## 2023-03-13 VITALS
SYSTOLIC BLOOD PRESSURE: 124 MMHG | OXYGEN SATURATION: 98 % | RESPIRATION RATE: 19 BRPM | DIASTOLIC BLOOD PRESSURE: 78 MMHG | HEART RATE: 89 BPM

## 2023-03-13 DIAGNOSIS — B37.0 THRUSH: Primary | ICD-10-CM

## 2023-03-13 DIAGNOSIS — J02.9 SORE THROAT: ICD-10-CM

## 2023-03-13 PROCEDURE — 99213 OFFICE O/P EST LOW 20 MIN: CPT

## 2023-03-13 NOTE — PROGRESS NOTES
Fernando Verde presents to [facility] with complaints of oral lesions and sore throat.      History of Present Illness  This is a 42-year-old male who presents to the clinic with complaints of oral lesions and sore throat.     Patient states that his symptoms originally started a few weeks ago, states that he had an eye procedure done on the left side of his mouth, and states a day or 2 later he developed some sore pretty severe sore throat symptoms on the right side of his throat, and in the right side of his mouth.  Patient states that he went to urgent care for evaluation, there they did do a strep test which was negative, but went ahead and treated him with antibiotics and steroids.  Patient states that he was doing great while on the medication, but then a day or 2 after the medication, he states that his sore throat returned, and that he developed these white lesions on the right side of his tongue and underneath of his tongue.  States that those have since then persisted, states that his sore throat has been pretty intense, is felt like pin needles in the back of his throat.  Denies any other symptoms, states that he is never had any other upper respiratory symptoms, no lower respiratory symptoms, and nothing over-the-counter has helped.  Also states that his sore throat has actually gone away today, but went ahead and came in for evaluation otherwise.    The following portions of the patient's history were personally reviewed and updated as appropriate: allergies, current medications, past medical history, past surgical history, past family history, and past social history.       Objective   Vital Signs:   /78   Pulse 89   Resp 19   SpO2 98%     There is no height or weight on file to calculate BMI.    All labs, imaging, test results, and specialty provider notes reviewed with patient.     Physical Exam  Vitals reviewed.   Constitutional:       Appearance:  Normal appearance.   HENT:      Mouth/Throat:      Mouth: Oral lesions present.      Tongue: Lesions present.   Neurological:      General: No focal deficit present.      Mental Status: He is alert and oriented to person, place, and time.            Assessment and Plan:  Diagnoses and all orders for this visit:    1. Thrush (Primary)  -     Nystatin-Diphenhydramine-Hydrocortisone-Lidocaine; Take 5 mL by mouth Every 3 (Three) Hours As Needed (sore throat; yeast infection).  Dispense: 250 mL; Refill: 0    2. Sore throat      Patient does have a oral yeast infection, thrush, so we will go ahead and give him Lela's Magic mouthwash that he can use on an as-needed basis to help with the sore throat and yeast infection.  Discussed with patient how to use this medication, that is likely from the antibiotic that he was on as well as just from the oral procedure that he had as well.  Discussed with patient that if is not better after the full course of this, to let us know and further work-up can be done at that time with his PCP.      Follow Up:  No follow-ups on file.    Patient was given instructions and counseling regarding his condition or for health maintenance advice. Please see specific information pulled into the AVS if appropriate.

## 2023-03-14 DIAGNOSIS — S52.135A CLOSED NONDISPLACED FRACTURE OF NECK OF LEFT RADIUS, INITIAL ENCOUNTER: ICD-10-CM

## 2023-03-14 DIAGNOSIS — M79.632 LEFT FOREARM PAIN: Primary | ICD-10-CM

## 2023-03-15 ENCOUNTER — TREATMENT (OUTPATIENT)
Dept: PHYSICAL THERAPY | Facility: CLINIC | Age: 43
End: 2023-03-15
Payer: COMMERCIAL

## 2023-03-15 DIAGNOSIS — S52.125D CLOSED NONDISPLACED FRACTURE OF HEAD OF LEFT RADIUS WITH ROUTINE HEALING, SUBSEQUENT ENCOUNTER: Primary | ICD-10-CM

## 2023-03-15 DIAGNOSIS — M79.632 LEFT FOREARM PAIN: ICD-10-CM

## 2023-03-15 PROCEDURE — 97110 THERAPEUTIC EXERCISES: CPT | Performed by: PHYSICAL THERAPIST

## 2023-03-15 PROCEDURE — 97530 THERAPEUTIC ACTIVITIES: CPT | Performed by: PHYSICAL THERAPIST

## 2023-03-15 PROCEDURE — 97112 NEUROMUSCULAR REEDUCATION: CPT | Performed by: PHYSICAL THERAPIST

## 2023-03-15 NOTE — PROGRESS NOTES
Occupational Therapy Daily Treatment Note  [address] [place] [address]      Patient: Fernando Verde   : 1980  Referring practitioner: Lora Jauregui PA-C  Date of Initial Visit: Type: THERAPY  Noted: 2023  Today's Date: 3/15/2023  Patient seen for 12 sessions         Fernando Verde reports: no pain today and his arm is getting better.        Subjective     Objective          Tenderness     Left Elbow   No tenderness in the cubital tunnel and radial head.     Neurological Testing     Sensation     Elbow   Left Elbow   Intact: light touch    Active Range of Motion   Left Shoulder   Normal active range of motion    Left Elbow   Flexion: WFL  Extension: WFL  Forearm supination: WFL  Forearm pronation: WFL    Left Wrist   Normal active range of motion    Strength/Myotome Testing     Left Elbow   Flexion: 5  Extension: 4+    Left Wrist/Hand   Wrist extension: 5  Wrist flexion: 5     (2nd hand position)     Trial 1: 55 lbs    Trial 2: 60 lbs    Trial 3: 70 lbs    Average: 61.67 lbs    Right Wrist/Hand      (2nd hand position)     Trial 1: 70 lbs    Trial 2: 74 lbs    Trial 3: 80 lbs    Average: 74.67 lbs      See Exercise, Manual, and Modality Logs for complete treatment.       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 The patient complains of pain in the L forearm.                  LTG 1: 12 weeks:  The patient will report a pain rating of 2/10 or better in order to improve sleep quality and tolerance to performance of activities of daily living.                                  STATUS:  Met                  STG 1a: 6weeks:  The patient will report a pain rating of 5/10 or better at worst.                                   STATUS:  Met  2. The patient has limited ROM of the L elbow.                  LTG 2: 12 weeks:  The patient will demonstrate  0/140 degrees of elbow motion to allow the patient to lift his children and wash his back.                                  STATUS:  Met                   STG 2a: 6 weeks:  The patient will demonstrate -10 degrees of elbow extension.                                  STATUS:  Met                  3. The patient has limited strength of the L hand.                  LTG 3: 12 weeks:  The patient will demonstrate 50 lbs L  strength in order to open jars/bottles and lift for work.                                  STATUS:  Met                  STG 3a: 6 weeks:  The patient will demonstrate 35 lbs L  strength.                                  STATUS:  Met  4. Carrying, Moving, and Handling Objects Functional Limitation                                   LTG 4: 12 weeks:  The patient will achieve a score of 11/55 on the Quick DASH.                                  STATUS:  New                  STG 4a: 6 weeks:  The patient will achieve a score of 19/55 on the Quick DASH.                                    STATUS:  New                            Visit Diagnoses:    ICD-10-CM ICD-9-CM   1. Closed nondisplaced fracture of head of left radius with routine healing, subsequent encounter  S52.125D V54.12   2. Left forearm pain  M79.632 729.5       Pt returning to MD and met his goals. Discharge from Occupational Thearpy.       Timed:  Manual Therapy:    0     mins  87029;  Therapeutic Exercise:    10     mins  10755;     Therapeutic Activity:    10     mins  68290;  Ultrasound:     0     mins  14072;    Electrical Stimulation:    0     mins 02161;  Neuromuscular Lon:    10    mins  37312;      Timed Treatment:   30   mins   Total Treatment:     30   min    DAIN Rivera/L  Occupational Therapist    Electronically signed   License number [id number]

## 2023-03-16 ENCOUNTER — OFFICE VISIT (OUTPATIENT)
Dept: ORTHOPEDIC SURGERY | Facility: CLINIC | Age: 43
End: 2023-03-16

## 2023-03-16 VITALS — BODY MASS INDEX: 37.79 KG/M2 | WEIGHT: 279 LBS | HEIGHT: 72 IN

## 2023-03-16 DIAGNOSIS — S52.135A CLOSED NONDISPLACED FRACTURE OF NECK OF LEFT RADIUS, INITIAL ENCOUNTER: ICD-10-CM

## 2023-03-16 DIAGNOSIS — S52.135A CLOSED NONDISPLACED FRACTURE OF NECK OF LEFT RADIUS, INITIAL ENCOUNTER: Primary | ICD-10-CM

## 2023-03-16 PROCEDURE — 99213 OFFICE O/P EST LOW 20 MIN: CPT | Performed by: PHYSICIAN ASSISTANT

## 2023-03-16 NOTE — PATIENT INSTRUCTIONS
X-rays taken and reviewed, showing excellent healing. Patient feels returned to baseline strength and ROM. PT note reports he is doing very well. Patient feels ready to return to full duty work. Updated work note provided. Continue home exercises.     Follow up as needed. Call with changes or concerns.

## 2023-03-16 NOTE — PROGRESS NOTES
"Chief Complaint  Pain and Follow-up of the Left Forearm    Subjective      Fernando Verde presents to [facility] for follow-up of left radial head fracture sustained in fall on ice on 12/23/2022.  Patient has been managed conservatively/nonoperatively with initial temporary immobilization and sling.  He has since discontinued the sling and has began participation in outpatient physical therapy.  PT does report that the patient has progressed well and reports he feels ready to return to full duty work.  The patient works in [employer] at [employer] [facility] and has been on light duty restrictions.  He presents today for follow-up reporting he feels he has returned to baseline with strength and ROM.  Denies left elbow pain.    Objective   Allergies   Allergen Reactions   • Lisinopril Cough       Vital Signs:   Ht 182.9 cm (72\")   Wt 127 kg (279 lb)   BMI 37.84 kg/m²       Physical Exam    Constitutional: Awake, alert. Well nourished appearance.    Integumentary: Warm, dry, intact. No obvious rashes.    HENT: Atraumatic, normocephalic.   Respiratory: Non labored respirations .   Cardiovascular: Intact peripheral pulses.    Psychiatric: Normal mood and affect. A&O X3    Ortho Exam  Left elbow: Skin is warm, dry, and intact.  No tenderness to palpation of left elbow.  Full flexion and extension of the elbow.  Full pronation and supination.  Patient is able to form a full fist.  Sensation intact light touch.  Distal neurovascular intact.    Imaging Results (Most Recent)     Procedure Component Value Units Date/Time    XR Forearm 2 View Left [[id number]] Resulted: 03/16/23 0851     Updated: 03/16/23 0858    Narrative:      X-Ray Report:  Study: X-rays ordered, taken in the office, and reviewed today.   Site: Left forearm Xray  Indication: Fracture  View: AP/Lateral view(s)  Findings: Well-healing left radial head fracture, appropriate anatomic   alignment.  Near complete healing.  Prior studies " available for comparison: yes                     Assessment and Plan   Problem List Items Addressed This Visit    None  Visit Diagnoses     Closed nondisplaced fracture of neck of left radius, subsequent encounter    -  Primary    Relevant Orders    XR Forearm 2 View Left (Completed)    Closed nondisplaced fracture of neck of left radius, initial encounter        Relevant Orders    XR Forearm 2 View Left (Completed)        Follow Up   Return if symptoms worsen or fail to improve.  Patient is a former smoker.  Encouraged continued tobacco cessation.  Did not discuss options for smoking cessation.    Patient Instructions   X-rays taken and reviewed, showing excellent healing. Patient feels returned to baseline strength and ROM. PT note reports he is doing very well. Patient feels ready to return to full duty work. Updated work note provided. Continue home exercises.     Follow up as needed. Call with changes or concerns.     Patient was given instructions and counseling regarding his condition or for health maintenance advice. Please see specific information pulled into the AVS if appropriate.

## 2023-04-24 ENCOUNTER — OFFICE VISIT (OUTPATIENT)
Dept: SLEEP MEDICINE | Facility: HOSPITAL | Age: 43
End: 2023-04-24
Payer: COMMERCIAL

## 2023-04-24 VITALS
HEIGHT: 73 IN | WEIGHT: 283 LBS | BODY MASS INDEX: 37.51 KG/M2 | SYSTOLIC BLOOD PRESSURE: 140 MMHG | HEART RATE: 81 BPM | OXYGEN SATURATION: 96 % | DIASTOLIC BLOOD PRESSURE: 90 MMHG

## 2023-04-24 DIAGNOSIS — E66.9 CLASS 2 OBESITY WITH BODY MASS INDEX (BMI) OF 37.0 TO 37.9 IN ADULT, UNSPECIFIED OBESITY TYPE, UNSPECIFIED WHETHER SERIOUS COMORBIDITY PRESENT: ICD-10-CM

## 2023-04-24 DIAGNOSIS — G47.33 OSA (OBSTRUCTIVE SLEEP APNEA): Primary | ICD-10-CM

## 2023-04-24 PROCEDURE — G0463 HOSPITAL OUTPT CLINIC VISIT: HCPCS

## 2023-04-24 NOTE — PROGRESS NOTES
[facility]  [address] [place], [place] [id number]  [place]   [place] [address]  Phone: [phone]  Fax: [phone]        Fernando Verde  [id number]   1980  42 y.o.  male      Referring Provider and PCP: Lee Brunner APRN    Type of service: Initial Sleep Medicine Consult.  Date of service: 4/24/2023      CHIEF COMPLAINT: Suspected sleep apnea      HISTORY OF PRESENT ILLNESS:  Thank you for asking us to see Fernando Verde.  Fernando Verde 42 y.o. was seen today on 4/24/2023 at [facility].  Patient presents today to establish care for treatment of obstructive sleep apnea.  He had an in-lab sleep study 12/2019 showing AHI of 11.8/h.  Started on CPAP.  He reports he had issues with the machine however he was sure he was using it all night long but apparently recently registering what long hours or so to use.  Possibly some air leakage playing a role here.  He ended up turning CPAP back in.  He was not able to find a dentist at that time to see if candidate for dental appliance.  He has gained some weight since 2017.  He has 14-year-old twin boys and 10-year-old daughter.  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  He is having symptoms of sleep apnea and wanting to get retested to look at treatment options.  He would like to try PAP therapy again prior to seeing if candidate for appliance as does have a hx of multiple teeth pulled.       SLEEP HISTORY:  Sleep schedule:  Bedtime: 11 PM to midnight weeknights, after midnight weekends  Wake time: 6:30 AM weekdays, 9 AM weekends  Normally does not take long to fall asleep  Average hours of sleep: 6.5-9  Number of naps per day: 0 weekdays, maybe 1 on the weekends    Symptoms:   In addition to the above, patient reports the following associated symptoms:  Have you ever awakened gasping for breath, coughing, choking: No   Change in weight:  Yes, gained 20 pounds  Morning headaches:  Yes   Awaken with a sore throat or dry mouth:   "Yes   Leg jerking at night:  Yes   Crawly feeling/urge sensation to move in the legs: No   Teeth grinding: No   Have you ever awakened at night with a sour taste or burning sensation in your chest:  No   Do you have muscle weakness with laughing or anger:  No   Have you ever felt paralyzed while going to sleep or waking up:  Yes   Sleepwalking: No   Nightmares: No   Nocturia (urination at night): 1 times per night  Memory Problem: No     Medical Conditions (PMH):   1. Anxiety  2. Depression  3. Hypertension  4. Hyperlipidemia    Social history:  Do you drive a commercial vehicle:  No   Shift work:  No   Tobacco use:  Yes, former  Alcohol use: 0 per week  Caffeinated drinks: 3 per day  Occupation: Sterile processing technician    Family History (parents and siblings) (pertaining to sleep medicine):  1. No relevant    Medications: reviewed    Allergies:  Lisinopril      REVIEW OF SYSTEMS:  Pertinent positive symptoms are:  • Snoring  • Witnessed apnea  • Daytime excessive sleepiness with [place] Sleepiness Scale of Total score: 10   • Fatigue  • Frequent urination  • TMJ  • Anxiety  • Depression      PHYSICAL EXAM:  CONSTITUTINONAL:   Vitals:    04/24/23 1300   BP: 140/90   Pulse: 81   SpO2: 96%   Weight: 128 kg (283 lb)   Height: 185.4 cm (72.99\")    Body mass index is 37.35 kg/m².   HEAD: atraumatic, normocephalic   NOSE: nasal passages are clear  THROAT: tonsils are nonenlarged, Mallampati class IV  NECK: Neck Circumference: 15.5 inches, trachea is midline  RESPIRATORY SYSTEM: Breath sounds are normal, breathing unlabored, no wheezing present on exam  CARDIOVASULAR SYSTEM: Heart sounds are regular rhythm and normal rate, no edema  NEUROLOGICAL SYSTEM: Alert and oriented x 3, normal gait  PSYCHIATRIC SYSTEM: Mood is normal/ appropriate     Office notes from care team reviewed. Office note dated 2/3/23, reviewed.       Most Recent A1C        2/3/2023    13:43   HGBA1C Most Recent   Hemoglobin A1C 5.7      "         ASSESSMENT AND PLAN:   · Witnessed apnea (R06.81): patient's symptoms and physical examination are consistent with sleep apnea (G47.30).   I discussed the signs, symptoms, and pathophysiology of sleep apnea with this patient.  I also discussed the potential complications of untreated sleep apnea including but not limited to resistant hypertension, insulin resistance, pulmonary hypertension, atrial fibrillation, stroke, nonrestorative sleep with hypersomnia which can increase risk for motor vehicle accidents, etc.   Different testing methods including home-based and lab based sleep studies were discussed with this patient.   Based on patient history and physical examination, the most appropriate study is Home sleep study.  The order for the sleep study is placed in [facility].  The test will be scheduled after prior authorization has been obtained through patient's insurance.  Treatment and management will be discussed in more detail with this patient after the test is completed.  All questions were answered to patient's satisfaction.   · Snoring (R06.83): snoring is the sound created by turbulent airflow vibrating upper airway soft tissue.  I have also discussed factors affecting snoring including sleep deprivation, sleeping on the back and alcohol ingestion. To minimize snoring, patient is advised to have adequate sleep, sleep on their side, and avoid alcohol and sedative medications around bedtime.   · Excessive daytime sleepiness: [place] Sleepiness Scale of Total score: 10.  There are many causes of excessive daytime sleepiness including but not limited to sleep apnea, shiftwork syndrome, depression, and other medical disorders such as heart disease, kidney disease, and liver failure.  The most serious cause of excessive sleepiness is due to neurological conditions such as narcolepsy/cataplexy.  More commonly excessive sleepiness is caused by sleep apnea with frequent awakenings during sleep time.  I have  discussed safety of driving and to remain vigilant while driving.  · Obesity: Body mass index is 37.35 kg/m².. Patients who are overweight or obese are at increased risk of sleep apnea/ sleep disordered breathing. Weight reduction and healthy lifestyle are encouraged in overweight/ obese patients as part of a comprehensive approach to sleep apnea treatment.    · Hypertension  · Hyperlipidemia  · Anxiety    Patient will follow-up after study, 31-90 days after PAP initiated if applicable or contact the office sooner for questions or concerns. Patient's questions were answered.            Thank you again for asking me to consult on this patient.  Please do not hesitate to call me if you have additional questions or concerns.       Komal Biswas DNP, APRN  [facility]

## 2023-05-09 ENCOUNTER — OFFICE VISIT (OUTPATIENT)
Dept: FAMILY MEDICINE CLINIC | Facility: CLINIC | Age: 43
End: 2023-05-09
Payer: COMMERCIAL

## 2023-05-09 VITALS
BODY MASS INDEX: 37.24 KG/M2 | OXYGEN SATURATION: 97 % | DIASTOLIC BLOOD PRESSURE: 88 MMHG | HEART RATE: 107 BPM | WEIGHT: 281 LBS | TEMPERATURE: 98.2 F | HEIGHT: 73 IN | SYSTOLIC BLOOD PRESSURE: 138 MMHG

## 2023-05-09 DIAGNOSIS — B34.9 NONSPECIFIC SYNDROME SUGGESTIVE OF VIRAL ILLNESS: Primary | ICD-10-CM

## 2023-05-09 RX ORDER — GUAIFENESIN AND DEXTROMETHORPHAN HYDROBROMIDE 600; 30 MG/1; MG/1
1 TABLET, EXTENDED RELEASE ORAL 2 TIMES DAILY PRN
Qty: 10 TABLET | Refills: 0 | Status: SHIPPED | OUTPATIENT
Start: 2023-05-09

## 2023-05-09 RX ORDER — ALBUTEROL SULFATE 90 UG/1
2 AEROSOL, METERED RESPIRATORY (INHALATION) EVERY 4 HOURS PRN
Qty: 18 G | Refills: 0 | Status: SHIPPED | OUTPATIENT
Start: 2023-05-09

## 2023-05-09 NOTE — PROGRESS NOTES
"Chief Complaint  Cough, Nasal Congestion, and URI    Subjective      Fernando Verde is a 42 year old male that presents to [facility] with c/o cough, congestion and drainage since Sunday.  He states that he felt fine yesterday. Woke up today with increased productive cough and dizziness when getting up fast. He has been a little short of breath after coughing. No wheezing, fever, body aches or chills. He has been taking Coricidin day and night time which has been helpful.  He reports that his kids have had similar symptoms.              History of Present Illness    Current Outpatient Medications   Medication Instructions   • albuterol sulfate  (90 Base) MCG/ACT inhaler 2 puffs, Inhalation, Every 4 Hours PRN   • atorvastatin (LIPITOR) 20 mg, Oral, Daily   • escitalopram (LEXAPRO) 20 mg, Oral, Daily   • famotidine (PEPCID) 40 mg, Oral, Daily   • guaifenesin-dextromethorphan (MUCINEX DM)  MG tablet sustained-release 12 hour tablet 1 tablet, Oral, 2 Times Daily PRN   • ibuprofen (ADVIL,MOTRIN) 800 MG tablet Take 1 tablet by mouth 3 (Three) Times a Day As Needed for Moderate Pain .   • losartan-hydrochlorothiazide (Hyzaar) 100-25 MG per tablet 1 tablet, Oral, Daily   • Nystatin-Diphenhydramine-Hydrocortisone-Lidocaine 5 mL, Oral, Every 3 Hours PRN       The following portions of the patient's history were reviewed and updated as appropriate: allergies, current medications, past family history, past medical history, past social history, past surgical history, and problem list.    Objective   Vital Signs:   /88 (BP Location: Right arm, Patient Position: Sitting) Comment (BP Location): manual  Pulse 107   Temp 98.2 °F (36.8 °C) (Infrared)   Ht 185.4 cm (73\")   Wt 127 kg (281 lb)   SpO2 97%   BMI 37.07 kg/m²     Wt Readings from Last 3 Encounters:   05/09/23 127 kg (281 lb)   04/24/23 128 kg (283 lb)   03/16/23 127 kg (279 lb)     BP Readings from Last 3 " Patient has the following symptoms: Patient said she is reacting poorly to new medication Pristiq. Patient has nausea, shakiness, her heart races she has weakness and cant sleep. Patient would like to discuss alternative medication.   The symptoms have been present for almost 3 weeks    Pharmacy has been verified.    Encounters:   05/09/23 138/88   04/24/23 140/90   03/13/23 124/78     Physical Exam  Vitals reviewed.   Constitutional:       General: He is not in acute distress.     Appearance: Normal appearance. He is well-developed. He is obese. He is ill-appearing.   HENT:      Head: Normocephalic and atraumatic.      Right Ear: Tympanic membrane, ear canal and external ear normal.      Left Ear: Tympanic membrane, ear canal and external ear normal.      Nose: Congestion present.      Mouth/Throat:      Mouth: Mucous membranes are moist.      Pharynx: No oropharyngeal exudate.   Eyes:      Conjunctiva/sclera: Conjunctivae normal.      Pupils: Pupils are equal, round, and reactive to light.   Cardiovascular:      Rate and Rhythm: Normal rate and regular rhythm.      Heart sounds: Normal heart sounds. No murmur heard.  Pulmonary:      Effort: Pulmonary effort is normal.      Breath sounds: Normal breath sounds. No wheezing or rhonchi.   Musculoskeletal:      Cervical back: Neck supple.   Lymphadenopathy:      Cervical: No cervical adenopathy.   Skin:     General: Skin is warm and dry.   Neurological:      Mental Status: He is alert and oriented to person, place, and time.   Psychiatric:         Mood and Affect: Affect normal.          Result Review :  The following data was reviewed by: DEVIN Gipson on 05/09/2023:        Lab Results (last 72 hours)     Procedure Component Value Units Date/Time    POCT SARS-CoV-2 Antigen FELECIA + Flu [[id number]]  (Normal) Collected: 05/10/23 0909    Specimen: Swab Updated: 05/10/23 0909     SARS Antigen Not Detected     Influenza A Antigen FELECIA Not Detected     Influenza B Antigen FELECIA Not Detected     Internal Control Passed     Lot Number [id number]     Expiration Date 1/30/24           No Images in the past 120 days found..    Lab Results   Component Value Date    SARSANTIGEN Not Detected 05/10/2023    COVID19 Detected (C) 06/12/2022    RAPFLUA Negative 03/06/2022    RAPFLUB Negative  03/06/2022    FLUAAG Not Detected 05/10/2023    FLUBAG Not Detected 05/10/2023    RAPSCRN Negative 03/02/2023    BILIRUBINUR Negative 01/20/2022       Procedures        Assessment and Plan   Diagnoses and all orders for this visit:    1. Nonspecific syndrome suggestive of viral illness (Primary)  -     POCT SARS-CoV-2 Antigen FELECIA + Flu  -     albuterol sulfate  (90 Base) MCG/ACT inhaler; Inhale 2 puffs Every 4 (Four) Hours As Needed for Wheezing.  Dispense: 18 g; Refill: 0  -     guaifenesin-dextromethorphan (MUCINEX DM)  MG tablet sustained-release 12 hour tablet; Take 1 tablet by mouth 2 (Two) Times a Day As Needed (cough/congestion).  Dispense: 10 tablet; Refill: 0          There are no discontinued medications.       Follow Up   No follow-ups on file.   Supportive care with plenty of rest, increase fluids, tylenol as needed.    Patient was given instructions and counseling regarding his condition or for health maintenance advice. Please see specific information pulled into the AVS if appropriate.       Sara Cooney, APRN  05/10/23  14:59 EDT

## 2023-05-10 LAB
EXPIRATION DATE: NORMAL
FLUAV AG UPPER RESP QL IA.RAPID: NOT DETECTED
FLUBV AG UPPER RESP QL IA.RAPID: NOT DETECTED
INTERNAL CONTROL: NORMAL
Lab: NORMAL
SARS-COV-2 AG UPPER RESP QL IA.RAPID: NOT DETECTED

## 2023-05-24 DIAGNOSIS — R06.83 SNORING: ICD-10-CM

## 2023-05-24 DIAGNOSIS — G47.19 EXCESSIVE DAYTIME SLEEPINESS: ICD-10-CM

## 2023-05-24 DIAGNOSIS — R06.81 WITNESSED EPISODE OF APNEA: Primary | ICD-10-CM

## 2023-06-07 ENCOUNTER — HOSPITAL ENCOUNTER (OUTPATIENT)
Dept: SLEEP MEDICINE | Facility: HOSPITAL | Age: 43
Discharge: HOME | End: 2023-06-07
Admitting: INTERNAL MEDICINE
Payer: COMMERCIAL

## 2023-06-07 DIAGNOSIS — R06.83 SNORING: ICD-10-CM

## 2023-06-07 DIAGNOSIS — R06.81 WITNESSED EPISODE OF APNEA: ICD-10-CM

## 2023-06-07 DIAGNOSIS — G47.19 EXCESSIVE DAYTIME SLEEPINESS: ICD-10-CM

## 2023-06-07 PROCEDURE — 95806 SLEEP STUDY UNATT&RESP EFFT: CPT

## 2023-06-07 PROCEDURE — 95806 SLEEP STUDY UNATT&RESP EFFT: CPT | Performed by: INTERNAL MEDICINE

## 2023-06-12 ENCOUNTER — TELEPHONE (OUTPATIENT)
Dept: SLEEP MEDICINE | Facility: HOSPITAL | Age: 43
End: 2023-06-12
Payer: COMMERCIAL

## 2023-06-12 DIAGNOSIS — R06.83 SNORING: ICD-10-CM

## 2023-06-12 DIAGNOSIS — G47.33 OSA (OBSTRUCTIVE SLEEP APNEA): Primary | ICD-10-CM

## 2023-08-04 ENCOUNTER — TELEPHONE (OUTPATIENT)
Dept: FAMILY MEDICINE CLINIC | Facility: CLINIC | Age: 43
End: 2023-08-04

## 2023-09-06 ENCOUNTER — OFFICE VISIT (OUTPATIENT)
Dept: FAMILY MEDICINE CLINIC | Facility: CLINIC | Age: 43
End: 2023-09-06
Payer: COMMERCIAL

## 2023-09-06 VITALS
BODY MASS INDEX: 37.67 KG/M2 | HEART RATE: 101 BPM | HEIGHT: 73 IN | DIASTOLIC BLOOD PRESSURE: 70 MMHG | WEIGHT: 284.2 LBS | SYSTOLIC BLOOD PRESSURE: 124 MMHG | OXYGEN SATURATION: 96 % | TEMPERATURE: 97 F

## 2023-09-06 DIAGNOSIS — E78.2 MIXED HYPERLIPIDEMIA: ICD-10-CM

## 2023-09-06 DIAGNOSIS — F41.8 SITUATIONAL ANXIETY: ICD-10-CM

## 2023-09-06 DIAGNOSIS — M25.521 RIGHT ELBOW PAIN: Primary | ICD-10-CM

## 2023-09-06 DIAGNOSIS — K21.00 GASTROESOPHAGEAL REFLUX DISEASE WITH ESOPHAGITIS WITHOUT HEMORRHAGE: ICD-10-CM

## 2023-09-06 DIAGNOSIS — M19.90 OSTEOARTHRITIS, UNSPECIFIED OSTEOARTHRITIS TYPE, UNSPECIFIED SITE: ICD-10-CM

## 2023-09-06 DIAGNOSIS — I10 PRIMARY HYPERTENSION: ICD-10-CM

## 2023-09-06 RX ORDER — ATORVASTATIN CALCIUM 20 MG/1
20 TABLET, FILM COATED ORAL DAILY
Qty: 90 TABLET | Refills: 1 | Status: SHIPPED | OUTPATIENT
Start: 2023-09-06

## 2023-09-06 RX ORDER — ESCITALOPRAM OXALATE 20 MG/1
20 TABLET ORAL DAILY
Qty: 90 TABLET | Refills: 1 | Status: SHIPPED | OUTPATIENT
Start: 2023-09-06

## 2023-09-06 RX ORDER — IBUPROFEN 800 MG/1
800 TABLET ORAL 3 TIMES DAILY PRN
Qty: 180 TABLET | Refills: 0 | Status: SHIPPED | OUTPATIENT
Start: 2023-09-06

## 2023-09-06 RX ORDER — FAMOTIDINE 40 MG/1
40 TABLET, FILM COATED ORAL DAILY
Qty: 30 TABLET | Refills: 0 | Status: SHIPPED | OUTPATIENT
Start: 2023-09-06

## 2023-09-06 RX ORDER — LOSARTAN POTASSIUM AND HYDROCHLOROTHIAZIDE 25; 100 MG/1; MG/1
1 TABLET ORAL DAILY
Qty: 90 TABLET | Refills: 1 | Status: SHIPPED | OUTPATIENT
Start: 2023-09-06
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
## 2023-11-08 ENCOUNTER — OFFICE VISIT (OUTPATIENT)
Dept: FAMILY MEDICINE CLINIC | Facility: CLINIC | Age: 43
End: 2023-11-08
Payer: COMMERCIAL

## 2023-11-08 VITALS
SYSTOLIC BLOOD PRESSURE: 126 MMHG | DIASTOLIC BLOOD PRESSURE: 78 MMHG | HEIGHT: 73 IN | TEMPERATURE: 97.3 F | HEART RATE: 107 BPM | BODY MASS INDEX: 37.51 KG/M2 | RESPIRATION RATE: 16 BRPM | WEIGHT: 283 LBS | OXYGEN SATURATION: 96 %

## 2023-11-08 DIAGNOSIS — Z20.822 CLOSE EXPOSURE TO COVID-19 VIRUS: ICD-10-CM

## 2023-11-08 DIAGNOSIS — H65.113 NON-RECURRENT ACUTE ALLERGIC OTITIS MEDIA OF BOTH EARS: Primary | ICD-10-CM

## 2023-11-08 PROCEDURE — 99213 OFFICE O/P EST LOW 20 MIN: CPT | Performed by: STUDENT IN AN ORGANIZED HEALTH CARE EDUCATION/TRAINING PROGRAM

## 2023-11-08 RX ORDER — PREDNISONE 20 MG/1
20 TABLET ORAL DAILY
Qty: 5 TABLET | Refills: 0 | Status: SHIPPED | OUTPATIENT
Start: 2023-11-08

## 2023-11-08 RX ORDER — AZITHROMYCIN 250 MG/1
TABLET, FILM COATED ORAL
Qty: 6 TABLET | Refills: 0 | Status: SHIPPED | OUTPATIENT
Start: 2023-11-08

## 2023-11-08 NOTE — PROGRESS NOTES
"Chief Complaint  Nasal Congestion and Cough (Cough and phlegm)    Subjective         Fernando Verde is a 43 y.o. male who presents to [facility]    43 years old comes to the clinic today for an acute visit.    6 days history of worsening sinus drainage/congestion, coughing, ear discomfort and body aches.  Does report some productive cough without any chest pain or shortness of breath.  Patient has been using over-the-counter medications with some relief.  Denies any fever in last 24 hours.    Patient visited urgent care for this symptoms few days ago, had negative COVID/rapid swabs and discharged home with symptomatic management.    Does not report any vomiting/abdominal symptoms or any anosmia.  Wife was tested positive with COVID about 7 to 8 days ago.    Reports no other acute concerns    Objective   Vital Signs:   Vitals:    11/08/23 1528   BP: 126/78   Pulse: 107   Resp: 16   Temp: 97.3 °F (36.3 °C)   SpO2: 96%   Weight: 128 kg (283 lb)   Height: 185.4 cm (73\")      Body mass index is 37.34 kg/m².   Wt Readings from Last 3 Encounters:   11/08/23 128 kg (283 lb)   09/06/23 129 kg (284 lb 3.2 oz)   05/09/23 127 kg (281 lb)      BP Readings from Last 3 Encounters:   11/08/23 126/78   11/05/23 137/94   09/06/23 124/70        Patient Care Team:  Lee Brunner APRN as PCP - General (Nurse Practitioner)     Physical Exam  HENT:      Right Ear: Tympanic membrane is retracted. Tympanic membrane has decreased mobility.      Left Ear: Tympanic membrane is retracted. Tympanic membrane has decreased mobility.      Mouth/Throat:      Pharynx: Pharyngeal swelling and posterior oropharyngeal erythema present.      Comments: Sinus drainage present  Pulmonary:      Effort: Pulmonary effort is normal.      Breath sounds: Normal breath sounds.                            Assessment and Plan   Diagnoses and all orders for this visit:    1. Non-recurrent acute allergic otitis media of both ears " (Primary)  -     azithromycin (Zithromax Z-Nestor) 250 MG tablet; Take 2 tablets by mouth on day 1, then 1 tablet daily on days 2-5  Dispense: 6 tablet; Refill: 0  -     predniSONE (DELTASONE) 20 MG tablet; Take 1 tablet by mouth Daily.  Dispense: 5 tablet; Refill: 0    2. Close exposure to COVID-19 virus  -     azithromycin (Zithromax Z-Nestor) 250 MG tablet; Take 2 tablets by mouth on day 1, then 1 tablet daily on days 2-5  Dispense: 6 tablet; Refill: 0  -     predniSONE (DELTASONE) 20 MG tablet; Take 1 tablet by mouth Daily.  Dispense: 5 tablet; Refill: 0      We will empirically treat patient.  Self currently protocol discussed, symptoms started 6 days ago.  Work note for today and tomorrow given to the patient.    Patient to call if not improved in 24 to 48 hours for further evaluation and possible imaging work-up/treatment change.  Strict ER precautions also discussed    Tobacco Use: Medium Risk (11/8/2023)    Patient History     Smoking Tobacco Use: Former     Smokeless Tobacco Use: Never     Passive Exposure: Never            Follow Up   No follow-ups on file.  Patient was given instructions and counseling regarding his condition or for health maintenance advice. Please see specific information pulled into the AVS if appropriate.

## 2024-02-21 DIAGNOSIS — E78.2 MIXED HYPERLIPIDEMIA: Primary | ICD-10-CM

## 2024-02-21 DIAGNOSIS — R73.01 IMPAIRED FASTING BLOOD SUGAR: ICD-10-CM

## 2024-02-21 DIAGNOSIS — R53.83 FATIGUE, UNSPECIFIED TYPE: ICD-10-CM

## 2024-02-21 DIAGNOSIS — I10 PRIMARY HYPERTENSION: ICD-10-CM

## 2024-03-01 ENCOUNTER — LAB (OUTPATIENT)
Dept: LAB | Facility: HOSPITAL | Age: 44
End: 2024-03-01
Payer: COMMERCIAL

## 2024-03-01 DIAGNOSIS — E78.2 MIXED HYPERLIPIDEMIA: ICD-10-CM

## 2024-03-01 DIAGNOSIS — R53.83 FATIGUE, UNSPECIFIED TYPE: ICD-10-CM

## 2024-03-01 DIAGNOSIS — R73.01 IMPAIRED FASTING BLOOD SUGAR: ICD-10-CM

## 2024-03-01 DIAGNOSIS — I10 PRIMARY HYPERTENSION: ICD-10-CM

## 2024-03-01 LAB
ALBUMIN SERPL-MCNC: 4.3 G/DL (ref 3.5–5.2)
ALBUMIN/GLOB SERPL: 1.3 G/DL
ALP SERPL-CCNC: 111 U/L (ref 39–117)
ALT SERPL W P-5'-P-CCNC: 31 U/L (ref 1–41)
ANION GAP SERPL CALCULATED.3IONS-SCNC: 10 MMOL/L (ref 5–15)
AST SERPL-CCNC: 18 U/L (ref 1–40)
BILIRUB SERPL-MCNC: 0.3 MG/DL (ref 0–1.2)
BUN SERPL-MCNC: 18 MG/DL (ref 6–20)
BUN/CREAT SERPL: 21.4 (ref 7–25)
CALCIUM SPEC-SCNC: 9.7 MG/DL (ref 8.6–10.5)
CHLORIDE SERPL-SCNC: 105 MMOL/L (ref 98–107)
CHOLEST SERPL-MCNC: 191 MG/DL (ref 0–200)
CO2 SERPL-SCNC: 28 MMOL/L (ref 22–29)
CREAT SERPL-MCNC: 0.84 MG/DL (ref 0.76–1.27)
DEPRECATED RDW RBC AUTO: 40.2 FL (ref 37–54)
EGFRCR SERPLBLD CKD-EPI 2021: 111 ML/MIN/1.73
ERYTHROCYTE [DISTWIDTH] IN BLOOD BY AUTOMATED COUNT: 13.1 % (ref 12.3–15.4)
GLOBULIN UR ELPH-MCNC: 3.2 GM/DL
GLUCOSE SERPL-MCNC: 93 MG/DL (ref 65–99)
HBA1C MFR BLD: 6.6 % (ref 4.8–5.6)
HCT VFR BLD AUTO: 44.5 % (ref 37.5–51)
HDLC SERPL-MCNC: 46 MG/DL (ref 40–60)
HGB BLD-MCNC: 14.8 G/DL (ref 13–17.7)
LDLC SERPL CALC-MCNC: 106 MG/DL (ref 0–100)
LDLC/HDLC SERPL: 2.16 {RATIO}
MCH RBC QN AUTO: 28 PG (ref 26.6–33)
MCHC RBC AUTO-ENTMCNC: 33.3 G/DL (ref 31.5–35.7)
MCV RBC AUTO: 84.3 FL (ref 79–97)
PLATELET # BLD AUTO: 294 10*3/MM3 (ref 140–450)
PMV BLD AUTO: 10.3 FL (ref 6–12)
POTASSIUM SERPL-SCNC: 4.5 MMOL/L (ref 3.5–5.2)
PROT SERPL-MCNC: 7.5 G/DL (ref 6–8.5)
RBC # BLD AUTO: 5.28 10*6/MM3 (ref 4.14–5.8)
SODIUM SERPL-SCNC: 143 MMOL/L (ref 136–145)
TRIGL SERPL-MCNC: 228 MG/DL (ref 0–150)
TSH SERPL DL<=0.05 MIU/L-ACNC: 2.21 UIU/ML (ref 0.27–4.2)
VLDLC SERPL-MCNC: 39 MG/DL (ref 5–40)
WBC NRBC COR # BLD AUTO: 8.08 10*3/MM3 (ref 3.4–10.8)

## 2024-03-01 PROCEDURE — 36415 COLL VENOUS BLD VENIPUNCTURE: CPT

## 2024-03-01 PROCEDURE — 80050 GENERAL HEALTH PANEL: CPT

## 2024-03-01 PROCEDURE — 83036 HEMOGLOBIN GLYCOSYLATED A1C: CPT

## 2024-03-01 PROCEDURE — 80061 LIPID PANEL: CPT

## 2024-03-06 ENCOUNTER — OFFICE VISIT (OUTPATIENT)
Dept: FAMILY MEDICINE CLINIC | Facility: CLINIC | Age: 44
End: 2024-03-06
Payer: COMMERCIAL

## 2024-03-06 VITALS
HEART RATE: 108 BPM | WEIGHT: 286.2 LBS | TEMPERATURE: 97.8 F | SYSTOLIC BLOOD PRESSURE: 128 MMHG | HEIGHT: 73 IN | OXYGEN SATURATION: 97 % | BODY MASS INDEX: 37.93 KG/M2 | DIASTOLIC BLOOD PRESSURE: 64 MMHG

## 2024-03-06 DIAGNOSIS — I10 PRIMARY HYPERTENSION: ICD-10-CM

## 2024-03-06 DIAGNOSIS — E11.9 TYPE 2 DIABETES MELLITUS WITHOUT COMPLICATION, WITHOUT LONG-TERM CURRENT USE OF INSULIN: Primary | ICD-10-CM

## 2024-03-06 DIAGNOSIS — F41.8 SITUATIONAL ANXIETY: ICD-10-CM

## 2024-03-06 DIAGNOSIS — M19.90 OSTEOARTHRITIS, UNSPECIFIED OSTEOARTHRITIS TYPE, UNSPECIFIED SITE: ICD-10-CM

## 2024-03-06 DIAGNOSIS — K21.00 GASTROESOPHAGEAL REFLUX DISEASE WITH ESOPHAGITIS WITHOUT HEMORRHAGE: ICD-10-CM

## 2024-03-06 DIAGNOSIS — E78.2 MIXED HYPERLIPIDEMIA: ICD-10-CM

## 2024-03-06 RX ORDER — ESCITALOPRAM OXALATE 20 MG/1
20 TABLET ORAL DAILY
Qty: 90 TABLET | Refills: 1 | Status: SHIPPED | OUTPATIENT
Start: 2024-03-06

## 2024-03-06 RX ORDER — IBUPROFEN 800 MG/1
800 TABLET ORAL 3 TIMES DAILY PRN
Qty: 180 TABLET | Refills: 0 | Status: SHIPPED | OUTPATIENT
Start: 2024-03-06

## 2024-03-06 RX ORDER — LOSARTAN POTASSIUM AND HYDROCHLOROTHIAZIDE 25; 100 MG/1; MG/1
1 TABLET ORAL DAILY
Qty: 90 TABLET | Refills: 1 | Status: SHIPPED | OUTPATIENT
Start: 2024-03-06

## 2024-03-06 RX ORDER — ATORVASTATIN CALCIUM 20 MG/1
20 TABLET, FILM COATED ORAL DAILY
Qty: 90 TABLET | Refills: 1 | Status: SHIPPED | OUTPATIENT
Start: 2024-03-06

## 2024-03-06 RX ORDER — FAMOTIDINE 40 MG/1
40 TABLET, FILM COATED ORAL DAILY
Qty: 30 TABLET | Refills: 0 | Status: SHIPPED | OUTPATIENT
Start: 2024-03-06

## 2024-03-06 RX ORDER — ESCITALOPRAM OXALATE 20 MG/1
20 TABLET ORAL DAILY
Qty: 90 TABLET | Refills: 1 | Status: SHIPPED | OUTPATIENT
Start: 2024-03-06 | End: 2024-03-06 | Stop reason: SDUPTHER

## 2024-03-06 NOTE — PROGRESS NOTES
"Chief Complaint  Hypertension (6 month follow up)    Subjective         Fernando Verde presents to [facility]  Patient presents to the office today for a 6-month follow-up.  I did review recent lab work with the patient including an A1c of 6.6%.  I did explain to the patient that he is technically diabetic at this point.  I did explain that I would give him 3 months to work on his diet by lowering his carbohydrate and sugar intake.  I explained that we would recheck this to reevaluate.  I did discuss medication management in the event that we need to start medication.  He does state that he is needing refills on his medications.  Blood pressure is 128/64.  Denies any chest pain shortness breath palpitations at this time.  Patient does state that he continues to have mild bloating and flatulence with certain foods.  I did ask him to keep a food diary to try to identified some of the pains that are causing the symptoms.    Hypertension         Objective     Vitals:    03/06/24 1548   BP: 128/64   BP Location: Right arm   Patient Position: Sitting   Cuff Size: Adult   Pulse: 108   Temp: 97.8 °F (36.6 °C)   TempSrc: Temporal   SpO2: 97%   Weight: 130 kg (286 lb 3.2 oz)   Height: 185.4 cm (73\")      Body mass index is 37.76 kg/m².             Physical Exam  Vitals reviewed.   Constitutional:       Appearance: Normal appearance.   Cardiovascular:      Rate and Rhythm: Normal rate and regular rhythm.      Pulses: Normal pulses.      Heart sounds: Normal heart sounds, S1 normal and S2 normal. No murmur heard.  Pulmonary:      Effort: Pulmonary effort is normal. No respiratory distress.      Breath sounds: Normal breath sounds.   Skin:     General: Skin is warm and dry.   Neurological:      Mental Status: He is alert and oriented to person, place, and time.   Psychiatric:         Attention and Perception: Attention normal.         Mood and Affect: Mood normal.         Behavior: Behavior " normal.          Result Review :   The following data was reviewed by: DEVIN Weaver on 03/06/2024:      Procedures    Assessment and Plan   Diagnoses and all orders for this visit:    1. Type 2 diabetes mellitus without complication, without long-term current use of insulin (Primary)  -     Hemoglobin A1c; Future    2. Primary hypertension  -     losartan-hydrochlorothiazide (Hyzaar) 100-25 MG per tablet; Take 1 tablet by mouth Daily.  Dispense: 90 tablet; Refill: 1    3. Osteoarthritis, unspecified osteoarthritis type, unspecified site  -     ibuprofen (ADVIL,MOTRIN) 800 MG tablet; Take 1 tablet by mouth 3 (Three) Times a Day As Needed for Moderate Pain.  Dispense: 180 tablet; Refill: 0    4. Situational anxiety  -     escitalopram (Lexapro) 20 MG tablet; Take 1 tablet by mouth Daily.  Dispense: 90 tablet; Refill: 1    5. Mixed hyperlipidemia  -     atorvastatin (LIPITOR) 20 MG tablet; Take 1 tablet by mouth Daily.  Dispense: 90 tablet; Refill: 1    6. Gastroesophageal reflux disease with esophagitis without hemorrhage  -     famotidine (Pepcid) 40 MG tablet; Take 1 tablet by mouth Daily.  Dispense: 30 tablet; Refill: 0          Follow Up   Return in about 6 months (around 9/6/2024) for Recheck.  Patient was given instructions and counseling regarding his condition or for health maintenance advice. Please see specific information pulled into the AVS if appropriate.

## 2024-03-11 DIAGNOSIS — E11.9 TYPE 2 DIABETES MELLITUS WITHOUT COMPLICATION, WITHOUT LONG-TERM CURRENT USE OF INSULIN: Primary | ICD-10-CM

## 2024-06-08 ENCOUNTER — LAB (OUTPATIENT)
Dept: LAB | Facility: HOSPITAL | Age: 44
End: 2024-06-08
Payer: COMMERCIAL

## 2024-06-08 DIAGNOSIS — E11.9 TYPE 2 DIABETES MELLITUS WITHOUT COMPLICATION, WITHOUT LONG-TERM CURRENT USE OF INSULIN: ICD-10-CM

## 2024-06-08 LAB — HBA1C MFR BLD: 6.4 % (ref 4.8–5.6)

## 2024-06-08 PROCEDURE — 83036 HEMOGLOBIN GLYCOSYLATED A1C: CPT

## 2024-06-08 PROCEDURE — 36415 COLL VENOUS BLD VENIPUNCTURE: CPT

## 2024-08-22 DIAGNOSIS — M19.90 OSTEOARTHRITIS, UNSPECIFIED OSTEOARTHRITIS TYPE, UNSPECIFIED SITE: ICD-10-CM

## 2024-08-23 RX ORDER — IBUPROFEN 800 MG/1
800 TABLET ORAL 3 TIMES DAILY PRN
Qty: 180 TABLET | Refills: 0 | Status: SHIPPED | OUTPATIENT
Start: 2024-08-23

## 2024-09-06 ENCOUNTER — OFFICE VISIT (OUTPATIENT)
Dept: FAMILY MEDICINE CLINIC | Facility: CLINIC | Age: 44
End: 2024-09-06
Payer: COMMERCIAL

## 2024-09-06 ENCOUNTER — LAB (OUTPATIENT)
Dept: LAB | Facility: HOSPITAL | Age: 44
End: 2024-09-06
Payer: COMMERCIAL

## 2024-09-06 VITALS
DIASTOLIC BLOOD PRESSURE: 74 MMHG | WEIGHT: 283.4 LBS | BODY MASS INDEX: 37.56 KG/M2 | SYSTOLIC BLOOD PRESSURE: 124 MMHG | TEMPERATURE: 96.9 F | HEIGHT: 73 IN | OXYGEN SATURATION: 97 % | HEART RATE: 88 BPM

## 2024-09-06 DIAGNOSIS — K21.00 GASTROESOPHAGEAL REFLUX DISEASE WITH ESOPHAGITIS WITHOUT HEMORRHAGE: ICD-10-CM

## 2024-09-06 DIAGNOSIS — E78.2 MIXED HYPERLIPIDEMIA: ICD-10-CM

## 2024-09-06 DIAGNOSIS — E11.9 TYPE 2 DIABETES MELLITUS WITHOUT COMPLICATION, WITHOUT LONG-TERM CURRENT USE OF INSULIN: ICD-10-CM

## 2024-09-06 DIAGNOSIS — I10 PRIMARY HYPERTENSION: ICD-10-CM

## 2024-09-06 DIAGNOSIS — R53.83 FATIGUE, UNSPECIFIED TYPE: ICD-10-CM

## 2024-09-06 DIAGNOSIS — E11.9 TYPE 2 DIABETES MELLITUS WITHOUT COMPLICATION, WITHOUT LONG-TERM CURRENT USE OF INSULIN: Primary | ICD-10-CM

## 2024-09-06 DIAGNOSIS — F41.8 SITUATIONAL ANXIETY: ICD-10-CM

## 2024-09-06 LAB
25(OH)D3 SERPL-MCNC: 33.4 NG/ML (ref 30–100)
ALBUMIN SERPL-MCNC: 4.3 G/DL (ref 3.5–5.2)
ALBUMIN UR-MCNC: 3.5 MG/DL
ALBUMIN/GLOB SERPL: 1.3 G/DL
ALP SERPL-CCNC: 110 U/L (ref 39–117)
ALT SERPL W P-5'-P-CCNC: 35 U/L (ref 1–41)
ANION GAP SERPL CALCULATED.3IONS-SCNC: 12 MMOL/L (ref 5–15)
AST SERPL-CCNC: 24 U/L (ref 1–40)
BILIRUB SERPL-MCNC: 0.6 MG/DL (ref 0–1.2)
BUN SERPL-MCNC: 13 MG/DL (ref 6–20)
BUN/CREAT SERPL: 14.3 (ref 7–25)
CALCIUM SPEC-SCNC: 9.9 MG/DL (ref 8.6–10.5)
CHLORIDE SERPL-SCNC: 103 MMOL/L (ref 98–107)
CHOLEST SERPL-MCNC: 177 MG/DL (ref 0–200)
CO2 SERPL-SCNC: 27 MMOL/L (ref 22–29)
CREAT SERPL-MCNC: 0.91 MG/DL (ref 0.76–1.27)
CREAT UR-MCNC: 271.4 MG/DL
DEPRECATED RDW RBC AUTO: 39.1 FL (ref 37–54)
EGFRCR SERPLBLD CKD-EPI 2021: 106.6 ML/MIN/1.73
ERYTHROCYTE [DISTWIDTH] IN BLOOD BY AUTOMATED COUNT: 12.9 % (ref 12.3–15.4)
GLOBULIN UR ELPH-MCNC: 3.4 GM/DL
GLUCOSE SERPL-MCNC: 106 MG/DL (ref 65–99)
HBA1C MFR BLD: 6.3 % (ref 4.8–5.6)
HCT VFR BLD AUTO: 45.9 % (ref 37.5–51)
HDLC SERPL-MCNC: 41 MG/DL (ref 40–60)
HGB BLD-MCNC: 15.7 G/DL (ref 13–17.7)
IRON 24H UR-MRATE: 109 MCG/DL (ref 59–158)
IRON SATN MFR SERPL: 24 % (ref 20–50)
LDLC SERPL CALC-MCNC: 106 MG/DL (ref 0–100)
LDLC/HDLC SERPL: 2.49 {RATIO}
MCH RBC QN AUTO: 28.9 PG (ref 26.6–33)
MCHC RBC AUTO-ENTMCNC: 34.2 G/DL (ref 31.5–35.7)
MCV RBC AUTO: 84.4 FL (ref 79–97)
MICROALBUMIN/CREAT UR: 12.9 MG/G (ref 0–29)
PLATELET # BLD AUTO: 322 10*3/MM3 (ref 140–450)
PMV BLD AUTO: 10.1 FL (ref 6–12)
POTASSIUM SERPL-SCNC: 4.3 MMOL/L (ref 3.5–5.2)
PROT SERPL-MCNC: 7.7 G/DL (ref 6–8.5)
RBC # BLD AUTO: 5.44 10*6/MM3 (ref 4.14–5.8)
SODIUM SERPL-SCNC: 142 MMOL/L (ref 136–145)
TESTOST SERPL-MCNC: 237 NG/DL (ref 249–836)
TIBC SERPL-MCNC: 462 MCG/DL (ref 298–536)
TRANSFERRIN SERPL-MCNC: 310 MG/DL (ref 200–360)
TRIGL SERPL-MCNC: 169 MG/DL (ref 0–150)
TSH SERPL DL<=0.05 MIU/L-ACNC: 1.64 UIU/ML (ref 0.27–4.2)
VIT B12 BLD-MCNC: 335 PG/ML (ref 211–946)
VLDLC SERPL-MCNC: 30 MG/DL (ref 5–40)
WBC NRBC COR # BLD AUTO: 8.75 10*3/MM3 (ref 3.4–10.8)

## 2024-09-06 PROCEDURE — 36415 COLL VENOUS BLD VENIPUNCTURE: CPT

## 2024-09-06 PROCEDURE — 82570 ASSAY OF URINE CREATININE: CPT

## 2024-09-06 PROCEDURE — 84403 ASSAY OF TOTAL TESTOSTERONE: CPT

## 2024-09-06 PROCEDURE — 80061 LIPID PANEL: CPT

## 2024-09-06 PROCEDURE — 83540 ASSAY OF IRON: CPT

## 2024-09-06 PROCEDURE — 82306 VITAMIN D 25 HYDROXY: CPT

## 2024-09-06 PROCEDURE — 84466 ASSAY OF TRANSFERRIN: CPT

## 2024-09-06 PROCEDURE — 80050 GENERAL HEALTH PANEL: CPT

## 2024-09-06 PROCEDURE — 82043 UR ALBUMIN QUANTITATIVE: CPT

## 2024-09-06 PROCEDURE — 83036 HEMOGLOBIN GLYCOSYLATED A1C: CPT

## 2024-09-06 PROCEDURE — 82607 VITAMIN B-12: CPT

## 2024-09-06 RX ORDER — LOSARTAN POTASSIUM AND HYDROCHLOROTHIAZIDE 25; 100 MG/1; MG/1
1 TABLET ORAL DAILY
Qty: 90 TABLET | Refills: 1 | Status: SHIPPED | OUTPATIENT
Start: 2024-09-06

## 2024-09-06 RX ORDER — ESCITALOPRAM OXALATE 20 MG/1
20 TABLET ORAL DAILY
Qty: 90 TABLET | Refills: 1 | Status: SHIPPED | OUTPATIENT
Start: 2024-09-06

## 2024-09-06 RX ORDER — ATORVASTATIN CALCIUM 20 MG/1
20 TABLET, FILM COATED ORAL DAILY
Qty: 90 TABLET | Refills: 1 | Status: SHIPPED | OUTPATIENT
Start: 2024-09-06

## 2024-09-06 RX ORDER — FAMOTIDINE 40 MG/1
40 TABLET, FILM COATED ORAL DAILY
Qty: 90 TABLET | Refills: 1 | Status: SHIPPED | OUTPATIENT
Start: 2024-09-06

## 2024-09-06 NOTE — PROGRESS NOTES
"Chief Complaint  Diabetes (6 month follow up)    Subjective         Fernando Verde presents to [facility]  Patient presents to the office for 6-month follow-up.  He does state that he is needing medication refills.  He states that he has been more fatigued recently.  He states that he gets 6 to 7 hours of sleep each night and feels that he sleeps throughout the night evening.  Patient does have a history of sleep apnea.  He states that he is not tolerating the CPAP machine well.  He states that he does not use this.  I did explain that we would do his lab work to evaluate for any other etiologies regarding the fatigue.  Blood pressure is 1/24/1974.  Denies any chest pain shortness breath palpitations this time.  I did discuss inspire which is the implantable device for sleep apnea.  I explained to the patient that if his labs are all unremarkable that we could for him if this is an option that he would like to consider.    Diabetes    Hypertension         Objective     Vitals:    09/06/24 0759   BP: 124/74   BP Location: Right arm   Patient Position: Sitting   Cuff Size: Adult   Pulse: 88   Temp: 96.9 °F (36.1 °C)   TempSrc: Temporal   SpO2: 97%   Weight: 129 kg (283 lb 6.4 oz)   Height: 185.4 cm (73\")      Body mass index is 37.39 kg/m².    Class 2 Severe Obesity (BMI >=35 and <=39.9). Obesity-related health conditions include the following: hypertension, diabetes mellitus, and dyslipidemias. Obesity is unchanged. BMI is is above average; no BMI management plan is appropriate. We discussed portion control and increasing exercise.        Physical Exam  Vitals reviewed.   Constitutional:       Appearance: Normal appearance.   Cardiovascular:      Rate and Rhythm: Normal rate and regular rhythm.      Pulses: Normal pulses.      Heart sounds: Normal heart sounds, S1 normal and S2 normal. No murmur heard.  Pulmonary:      Effort: Pulmonary effort is normal. No respiratory distress. "      Breath sounds: Normal breath sounds.   Skin:     General: Skin is warm and dry.   Neurological:      Mental Status: He is alert and oriented to person, place, and time.   Psychiatric:         Attention and Perception: Attention normal.         Mood and Affect: Mood normal.         Behavior: Behavior normal.          Result Review :   The following data was reviewed by: DEVIN Weaver on 09/06/2024:      Procedures    Assessment and Plan   Diagnoses and all orders for this visit:    1. Type 2 diabetes mellitus without complication, without long-term current use of insulin (Primary)  -     CBC (No Diff); Future  -     Comprehensive Metabolic Panel; Future  -     Hemoglobin A1c; Future  -     Lipid Panel; Future  -     Microalbumin / Creatinine Urine Ratio - Urine, Clean Catch; Future  -     TSH; Future    2. Mixed hyperlipidemia  -     atorvastatin (LIPITOR) 20 MG tablet; Take 1 tablet by mouth Daily.  Dispense: 90 tablet; Refill: 1  -     CBC (No Diff); Future  -     Comprehensive Metabolic Panel; Future  -     Lipid Panel; Future    3. Situational anxiety  -     escitalopram (Lexapro) 20 MG tablet; Take 1 tablet by mouth Daily.  Dispense: 90 tablet; Refill: 1    4. Gastroesophageal reflux disease with esophagitis without hemorrhage  -     famotidine (Pepcid) 40 MG tablet; Take 1 tablet by mouth Daily.  Dispense: 90 tablet; Refill: 1    5. Primary hypertension  -     losartan-hydrochlorothiazide (Hyzaar) 100-25 MG per tablet; Take 1 tablet by mouth Daily.  Dispense: 90 tablet; Refill: 1  -     CBC (No Diff); Future  -     Comprehensive Metabolic Panel; Future  -     Lipid Panel; Future    6. Fatigue, unspecified type  -     CBC (No Diff); Future  -     Testosterone; Future  -     Iron and TIBC; Future  -     Vitamin B12; Future  -     Vitamin D 25 hydroxy; Future          Follow Up   Return in about 6 months (around 3/6/2025) for Recheck.  Patient was given instructions and counseling regarding his condition  or for health maintenance advice. Please see specific information pulled into the AVS if appropriate.

## 2024-09-11 ENCOUNTER — TELEPHONE (OUTPATIENT)
Dept: FAMILY MEDICINE CLINIC | Facility: CLINIC | Age: 44
End: 2024-09-11
Payer: COMMERCIAL

## 2024-10-07 DIAGNOSIS — E11.9 TYPE 2 DIABETES MELLITUS WITHOUT COMPLICATION, WITHOUT LONG-TERM CURRENT USE OF INSULIN: ICD-10-CM

## 2024-10-08 ENCOUNTER — TELEPHONE (OUTPATIENT)
Dept: FAMILY MEDICINE CLINIC | Facility: CLINIC | Age: 44
End: 2024-10-08

## 2024-10-08 ENCOUNTER — CLINICAL SUPPORT (OUTPATIENT)
Dept: FAMILY MEDICINE CLINIC | Facility: CLINIC | Age: 44
End: 2024-10-08
Payer: COMMERCIAL

## 2024-10-08 DIAGNOSIS — Z79.899 MEDICATION MANAGEMENT: Primary | ICD-10-CM

## 2024-10-08 LAB
AMPHET+METHAMPHET UR QL: NEGATIVE
AMPHETAMINE INTERNAL CONTROL: NORMAL
AMPHETAMINES UR QL: NEGATIVE
BARBITURATE INTERNAL CONTROL: NORMAL
BARBITURATES UR QL SCN: NEGATIVE
BENZODIAZ UR QL SCN: NEGATIVE
BENZODIAZEPINE INTERNAL CONTROL: NORMAL
BUPRENORPHINE INTERNAL CONTROL: NORMAL
BUPRENORPHINE SERPL-MCNC: NEGATIVE NG/ML
CANNABINOIDS SERPL QL: NEGATIVE
COCAINE INTERNAL CONTROL: NORMAL
COCAINE UR QL: NEGATIVE
EXPIRATION DATE: NORMAL
Lab: NORMAL
MDMA (ECSTASY) INTERNAL CONTROL: NORMAL
MDMA UR QL SCN: NEGATIVE
METHADONE INTERNAL CONTROL: NORMAL
METHADONE UR QL SCN: NEGATIVE
METHAMPHETAMINE INTERNAL CONTROL: NORMAL
MORPHINE INTERNAL CONTROL: NORMAL
MORPHINE/OPIATES SCREEN, URINE: NEGATIVE
OXYCODONE INTERNAL CONTROL: NORMAL
OXYCODONE UR QL SCN: NEGATIVE
PCP UR QL SCN: NEGATIVE
PHENCYCLIDINE INTERNAL CONTROL: NORMAL
THC INTERNAL CONTROL: NORMAL

## 2024-10-08 PROCEDURE — 80305 DRUG TEST PRSMV DIR OPT OBS: CPT | Performed by: NURSE PRACTITIONER

## 2024-10-11 DIAGNOSIS — E29.1 HYPOGONADISM IN MALE: Primary | ICD-10-CM

## 2024-10-11 RX ORDER — TESTOSTERONE CYPIONATE 1000 MG/10ML
100 INJECTION, SOLUTION INTRAMUSCULAR
Qty: 2 ML | Refills: 0 | Status: SHIPPED | OUTPATIENT
Start: 2024-10-11

## 2024-10-22 DIAGNOSIS — M19.90 OSTEOARTHRITIS, UNSPECIFIED OSTEOARTHRITIS TYPE, UNSPECIFIED SITE: ICD-10-CM

## 2024-10-22 RX ORDER — IBUPROFEN 800 MG/1
800 TABLET, FILM COATED ORAL 3 TIMES DAILY PRN
Qty: 180 TABLET | Refills: 0 | Status: SHIPPED | OUTPATIENT
Start: 2024-10-22

## 2024-11-13 ENCOUNTER — TELEPHONE (OUTPATIENT)
Dept: FAMILY MEDICINE CLINIC | Facility: CLINIC | Age: 44
End: 2024-11-13
Payer: COMMERCIAL

## 2024-11-13 NOTE — TELEPHONE ENCOUNTER
ELECTRONIC PA SYSTEM IS DOWN AT THE MOMENT      Tirzepatide 2.5 MG/0.5ML solution auto-injector   PAPER PA FORM HAS BEEN FAXED WITH RECORDS     [employer] MESSAGE SENT TO PATIENT

## 2024-11-18 ENCOUNTER — TELEPHONE (OUTPATIENT)
Dept: FAMILY MEDICINE CLINIC | Facility: CLINIC | Age: 44
End: 2024-11-18
Payer: COMMERCIAL

## 2024-11-18 NOTE — TELEPHONE ENCOUNTER
Called and spoke with Fernando, advised he can bring the testosterone in to the office and we can show him how to administer so he can do this at home. Fernando verbalized understanding, he is off Friday and states he will come then for education.

## 2024-11-18 NOTE — TELEPHONE ENCOUNTER
Caller: Fernando Verde    Relationship: Self    Best call back number: [phone]     What is the best time to reach you: ANY     Who are you requesting to speak with (clinical staff, provider,  specific staff member): CLINICAL     What was the call regarding: CALLER STATED THAT A VOICEMAIL IS ALRIGHT.  HE HAS PICKED UP HIS TESTOSTERONE AND NEEDING INSTRUCTION TO INJECT.  HE MENTIONED NEEDING TO KNOW WHICH MUSCLE TO INJECT INTO.

## 2024-11-22 ENCOUNTER — CLINICAL SUPPORT (OUTPATIENT)
Dept: FAMILY MEDICINE CLINIC | Facility: CLINIC | Age: 44
End: 2024-11-22
Payer: COMMERCIAL

## 2024-11-22 DIAGNOSIS — R79.89 LOW TESTOSTERONE IN MALE: Primary | ICD-10-CM

## 2024-11-22 RX ORDER — TESTOSTERONE CYPIONATE 200 MG/ML
100 INJECTION, SOLUTION INTRAMUSCULAR
Status: SHIPPED | OUTPATIENT
Start: 2024-11-22

## 2024-11-22 RX ADMIN — TESTOSTERONE CYPIONATE 100 MG: 200 INJECTION, SOLUTION INTRAMUSCULAR at 10:14

## 2024-12-13 ENCOUNTER — CLINICAL SUPPORT (OUTPATIENT)
Dept: FAMILY MEDICINE CLINIC | Facility: CLINIC | Age: 44
End: 2024-12-13
Payer: COMMERCIAL

## 2024-12-13 DIAGNOSIS — R79.89 LOW TESTOSTERONE IN MALE: Primary | ICD-10-CM

## 2024-12-13 PROCEDURE — 96372 THER/PROPH/DIAG INJ SC/IM: CPT | Performed by: STUDENT IN AN ORGANIZED HEALTH CARE EDUCATION/TRAINING PROGRAM

## 2024-12-13 RX ADMIN — TESTOSTERONE CYPIONATE 100 MG: 200 INJECTION, SOLUTION INTRAMUSCULAR at 14:36

## 2024-12-23 RX ORDER — TESTOSTERONE CYPIONATE 200 MG/ML
INJECTION, SOLUTION INTRAMUSCULAR
Qty: 2 ML | Refills: 0 | Status: SHIPPED | OUTPATIENT
Start: 2024-12-23

## 2024-12-23 NOTE — TELEPHONE ENCOUNTER
UPCOMING APPTS  With Family Medicine (DEVIN Weaver)  03/10/2025 at 7:45 AM  LAST OFFICE VISIT - THIS DEPT  9/6/2024 Lee Brunner APRN    [facility] 10/8/2024

## 2024-12-26 RX ORDER — TESTOSTERONE CYPIONATE 200 MG/ML
INJECTION, SOLUTION INTRAMUSCULAR
Qty: 2 ML | Refills: 0 | OUTPATIENT
Start: 2024-12-26

## 2024-12-30 ENCOUNTER — CLINICAL SUPPORT (OUTPATIENT)
Dept: FAMILY MEDICINE CLINIC | Facility: CLINIC | Age: 44
End: 2024-12-30
Payer: COMMERCIAL

## 2024-12-30 ENCOUNTER — TELEPHONE (OUTPATIENT)
Dept: FAMILY MEDICINE CLINIC | Facility: CLINIC | Age: 44
End: 2024-12-30

## 2024-12-30 DIAGNOSIS — E11.9 TYPE 2 DIABETES MELLITUS WITHOUT COMPLICATION, WITHOUT LONG-TERM CURRENT USE OF INSULIN: ICD-10-CM

## 2024-12-30 DIAGNOSIS — I10 PRIMARY HYPERTENSION: ICD-10-CM

## 2024-12-30 DIAGNOSIS — E29.1 HYPOGONADISM IN MALE: Primary | ICD-10-CM

## 2024-12-30 DIAGNOSIS — E78.2 MIXED HYPERLIPIDEMIA: ICD-10-CM

## 2024-12-30 PROCEDURE — 96372 THER/PROPH/DIAG INJ SC/IM: CPT | Performed by: NURSE PRACTITIONER

## 2024-12-30 RX ADMIN — TESTOSTERONE CYPIONATE 100 MG: 200 INJECTION, SOLUTION INTRAMUSCULAR at 16:17

## 2024-12-30 NOTE — TELEPHONE ENCOUNTER
Pt stopped by for testosterone injections. He is requesting that we place labs to check levels prior to his appointment in March. Can you sign pended lab orders

## 2025-02-10 ENCOUNTER — CLINICAL SUPPORT (OUTPATIENT)
Dept: FAMILY MEDICINE CLINIC | Facility: CLINIC | Age: 45
End: 2025-02-10
Payer: COMMERCIAL

## 2025-02-10 DIAGNOSIS — R79.89 LOW TESTOSTERONE: Primary | ICD-10-CM

## 2025-02-10 PROCEDURE — 96372 THER/PROPH/DIAG INJ SC/IM: CPT | Performed by: NURSE PRACTITIONER

## 2025-02-10 RX ADMIN — TESTOSTERONE CYPIONATE 100 MG: 200 INJECTION, SOLUTION INTRAMUSCULAR at 15:42

## 2025-02-11 RX ORDER — TESTOSTERONE CYPIONATE 200 MG/ML
INJECTION, SOLUTION INTRAMUSCULAR
Qty: 2 ML | Refills: 0 | Status: SHIPPED | OUTPATIENT
Start: 2025-02-11

## 2025-03-15 ENCOUNTER — LAB (OUTPATIENT)
Facility: HOSPITAL | Age: 45
End: 2025-03-15
Payer: COMMERCIAL

## 2025-03-15 DIAGNOSIS — I10 PRIMARY HYPERTENSION: ICD-10-CM

## 2025-03-15 DIAGNOSIS — E29.1 HYPOGONADISM IN MALE: ICD-10-CM

## 2025-03-15 DIAGNOSIS — E78.2 MIXED HYPERLIPIDEMIA: ICD-10-CM

## 2025-03-15 DIAGNOSIS — E11.9 TYPE 2 DIABETES MELLITUS WITHOUT COMPLICATION, WITHOUT LONG-TERM CURRENT USE OF INSULIN: ICD-10-CM

## 2025-03-15 LAB
ALBUMIN SERPL-MCNC: 4 G/DL (ref 3.5–5.2)
ALBUMIN/GLOB SERPL: 1.1 G/DL
ALP SERPL-CCNC: 114 U/L (ref 39–117)
ALT SERPL W P-5'-P-CCNC: 31 U/L (ref 1–41)
ANION GAP SERPL CALCULATED.3IONS-SCNC: 8.4 MMOL/L (ref 5–15)
AST SERPL-CCNC: 23 U/L (ref 1–40)
BILIRUB SERPL-MCNC: 0.4 MG/DL (ref 0–1.2)
BUN SERPL-MCNC: 16 MG/DL (ref 6–20)
BUN/CREAT SERPL: 17.6 (ref 7–25)
CALCIUM SPEC-SCNC: 9.6 MG/DL (ref 8.6–10.5)
CHLORIDE SERPL-SCNC: 106 MMOL/L (ref 98–107)
CHOLEST SERPL-MCNC: 198 MG/DL (ref 0–200)
CO2 SERPL-SCNC: 28.6 MMOL/L (ref 22–29)
CREAT SERPL-MCNC: 0.91 MG/DL (ref 0.76–1.27)
DEPRECATED RDW RBC AUTO: 41 FL (ref 37–54)
EGFRCR SERPLBLD CKD-EPI 2021: 106.6 ML/MIN/1.73
ERYTHROCYTE [DISTWIDTH] IN BLOOD BY AUTOMATED COUNT: 13 % (ref 12.3–15.4)
GLOBULIN UR ELPH-MCNC: 3.5 GM/DL
GLUCOSE SERPL-MCNC: 110 MG/DL (ref 65–99)
HBA1C MFR BLD: 6.7 % (ref 4.8–5.6)
HCT VFR BLD AUTO: 48.1 % (ref 37.5–51)
HDLC SERPL-MCNC: 43 MG/DL (ref 40–60)
HGB BLD-MCNC: 15.7 G/DL (ref 13–17.7)
LDLC SERPL CALC-MCNC: 125 MG/DL (ref 0–100)
LDLC/HDLC SERPL: 2.81 {RATIO}
MCH RBC QN AUTO: 28 PG (ref 26.6–33)
MCHC RBC AUTO-ENTMCNC: 32.6 G/DL (ref 31.5–35.7)
MCV RBC AUTO: 85.9 FL (ref 79–97)
PLATELET # BLD AUTO: 302 10*3/MM3 (ref 140–450)
PMV BLD AUTO: 10.5 FL (ref 6–12)
POTASSIUM SERPL-SCNC: 4.2 MMOL/L (ref 3.5–5.2)
PROT SERPL-MCNC: 7.5 G/DL (ref 6–8.5)
RBC # BLD AUTO: 5.6 10*6/MM3 (ref 4.14–5.8)
SODIUM SERPL-SCNC: 143 MMOL/L (ref 136–145)
TESTOST SERPL-MCNC: 326 NG/DL (ref 249–836)
TRIGL SERPL-MCNC: 171 MG/DL (ref 0–150)
TSH SERPL DL<=0.05 MIU/L-ACNC: 1.73 UIU/ML (ref 0.27–4.2)
VLDLC SERPL-MCNC: 30 MG/DL (ref 5–40)
WBC NRBC COR # BLD AUTO: 8.94 10*3/MM3 (ref 3.4–10.8)

## 2025-03-15 PROCEDURE — 84403 ASSAY OF TOTAL TESTOSTERONE: CPT

## 2025-03-15 PROCEDURE — 36415 COLL VENOUS BLD VENIPUNCTURE: CPT

## 2025-03-15 PROCEDURE — 83036 HEMOGLOBIN GLYCOSYLATED A1C: CPT

## 2025-03-15 PROCEDURE — 80050 GENERAL HEALTH PANEL: CPT

## 2025-03-15 PROCEDURE — 80061 LIPID PANEL: CPT

## 2025-03-17 ENCOUNTER — OFFICE VISIT (OUTPATIENT)
Dept: FAMILY MEDICINE CLINIC | Facility: CLINIC | Age: 45
End: 2025-03-17
Payer: COMMERCIAL

## 2025-03-17 VITALS
OXYGEN SATURATION: 98 % | BODY MASS INDEX: 38.3 KG/M2 | DIASTOLIC BLOOD PRESSURE: 72 MMHG | HEART RATE: 111 BPM | SYSTOLIC BLOOD PRESSURE: 128 MMHG | TEMPERATURE: 98.6 F | HEIGHT: 73 IN | WEIGHT: 289 LBS

## 2025-03-17 DIAGNOSIS — L29.9 EAR ITCHING: ICD-10-CM

## 2025-03-17 DIAGNOSIS — I10 PRIMARY HYPERTENSION: ICD-10-CM

## 2025-03-17 DIAGNOSIS — E78.2 MIXED HYPERLIPIDEMIA: ICD-10-CM

## 2025-03-17 DIAGNOSIS — E29.1 HYPOGONADISM IN MALE: Primary | ICD-10-CM

## 2025-03-17 DIAGNOSIS — E11.9 TYPE 2 DIABETES MELLITUS WITHOUT COMPLICATION, WITHOUT LONG-TERM CURRENT USE OF INSULIN: ICD-10-CM

## 2025-03-17 DIAGNOSIS — F41.8 SITUATIONAL ANXIETY: ICD-10-CM

## 2025-03-17 PROCEDURE — 99214 OFFICE O/P EST MOD 30 MIN: CPT | Performed by: NURSE PRACTITIONER

## 2025-03-17 PROCEDURE — 96372 THER/PROPH/DIAG INJ SC/IM: CPT | Performed by: NURSE PRACTITIONER

## 2025-03-17 RX ORDER — HYDROCORTISONE AND ACETIC ACID 20.75; 10.375 MG/ML; MG/ML
5 SOLUTION AURICULAR (OTIC) 2 TIMES DAILY
Qty: 10 ML | Refills: 0 | Status: SHIPPED | OUTPATIENT
Start: 2025-03-17

## 2025-03-17 RX ORDER — ESCITALOPRAM OXALATE 20 MG/1
20 TABLET ORAL DAILY
Qty: 90 TABLET | Refills: 1 | Status: SHIPPED | OUTPATIENT
Start: 2025-03-17

## 2025-03-17 RX ORDER — ATORVASTATIN CALCIUM 20 MG/1
20 TABLET, FILM COATED ORAL DAILY
Qty: 90 TABLET | Refills: 1 | Status: SHIPPED | OUTPATIENT
Start: 2025-03-17

## 2025-03-17 RX ORDER — LOSARTAN POTASSIUM AND HYDROCHLOROTHIAZIDE 25; 100 MG/1; MG/1
1 TABLET ORAL DAILY
Qty: 90 TABLET | Refills: 1 | Status: SHIPPED | OUTPATIENT
Start: 2025-03-17

## 2025-03-17 RX ADMIN — TESTOSTERONE CYPIONATE 100 MG: 200 INJECTION, SOLUTION INTRAMUSCULAR at 07:12

## 2025-03-17 NOTE — PROGRESS NOTES
"Chief Complaint  Annual Exam    Subjective         Fernando Verde presents to [facility]  Patient presents to the office for a checkup.  Blood pressure is 128/72.  I did review recent lab work with the patient including his A1c of 6.7%.  Patient states that he had not started the Mounjaro as he had questions about the medication.  All questions were discussed with the patient.  He does state that he is going to start the medication.  I explained that I would go ahead and send the 5 mg dose to the pharmacy and he can let the office know when he reaches a third pen of this medication.  I explained that I would keep him at the 7.5 mg to see how he tolerates the medication for few months.  He does state that his left ear has been bothering him and itching more frequently.  He denies any pain.  I did explain that we would recheck his labs in 6 months.  He verbalizes understanding.         Objective     Vitals:    03/17/25 0705   BP: 128/72   BP Location: Right arm   Patient Position: Sitting   Cuff Size: Adult   Pulse: 111   Temp: 98.6 °F (37 °C)   TempSrc: Temporal   SpO2: 98%   Weight: 131 kg (289 lb)   Height: 185.4 cm (73\")      Body mass index is 38.13 kg/m².             Physical Exam  Vitals reviewed.   Constitutional:       Appearance: Normal appearance.   HENT:      Right Ear: Tympanic membrane, ear canal and external ear normal.      Left Ear: Tympanic membrane, ear canal and external ear normal.      Nose: Nose normal.   Cardiovascular:      Rate and Rhythm: Normal rate and regular rhythm.      Pulses: Normal pulses.      Heart sounds: Normal heart sounds, S1 normal and S2 normal. No murmur heard.  Pulmonary:      Effort: Pulmonary effort is normal. No respiratory distress.      Breath sounds: Normal breath sounds.   Skin:     General: Skin is warm and dry.   Neurological:      Mental Status: He is alert and oriented to person, place, and time.   Psychiatric:         " Attention and Perception: Attention normal.         Mood and Affect: Mood normal.         Behavior: Behavior normal.          Result Review :   The following data was reviewed by: DEVIN Weaver on 03/17/2025:      Procedures    Assessment and Plan   Diagnoses and all orders for this visit:    1. Hypogonadism in male (Primary)    2. Mixed hyperlipidemia  -     atorvastatin (LIPITOR) 20 MG tablet; Take 1 tablet by mouth Daily.  Dispense: 90 tablet; Refill: 1  -     CBC (No Diff); Future  -     Comprehensive Metabolic Panel; Future  -     Lipid Panel; Future  -     TSH+Free T4; Future  -     Hemoglobin A1c; Future  -     Microalbumin / Creatinine Urine Ratio - Urine, Clean Catch; Future    3. Situational anxiety  -     escitalopram (Lexapro) 20 MG tablet; Take 1 tablet by mouth Daily.  Dispense: 90 tablet; Refill: 1    4. Primary hypertension  -     losartan-hydrochlorothiazide (Hyzaar) 100-25 MG per tablet; Take 1 tablet by mouth Daily.  Dispense: 90 tablet; Refill: 1  -     CBC (No Diff); Future  -     Comprehensive Metabolic Panel; Future  -     Lipid Panel; Future  -     TSH+Free T4; Future  -     Hemoglobin A1c; Future  -     Microalbumin / Creatinine Urine Ratio - Urine, Clean Catch; Future    5. Type 2 diabetes mellitus without complication, without long-term current use of insulin  -     Tirzepatide 5 MG/0.5ML solution auto-injector; Inject 5 mg under the skin into the appropriate area as directed 1 (One) Time Per Week.  Dispense: 2 mL; Refill: 0  -     CBC (No Diff); Future  -     Comprehensive Metabolic Panel; Future  -     Lipid Panel; Future  -     TSH+Free T4; Future  -     Hemoglobin A1c; Future  -     Microalbumin / Creatinine Urine Ratio - Urine, Clean Catch; Future    6. Ear itching  -     acetic acid-hydrocortisone (VOSOL-HC) 1-2 % otic solution; Administer 5 drops into ear(s) as directed by provider 2 (Two) Times a Day.  Dispense: 10 mL; Refill: 0          Follow Up   Return in about 6 months  (around 9/17/2025), or if symptoms worsen or fail to improve.  Patient was given instructions and counseling regarding his condition or for health maintenance advice. Please see specific information pulled into the AVS if appropriate.

## 2025-04-02 ENCOUNTER — CLINICAL SUPPORT (OUTPATIENT)
Dept: FAMILY MEDICINE CLINIC | Facility: CLINIC | Age: 45
End: 2025-04-02
Payer: COMMERCIAL

## 2025-04-02 DIAGNOSIS — R79.89 LOW TESTOSTERONE: Primary | ICD-10-CM

## 2025-04-02 RX ADMIN — TESTOSTERONE CYPIONATE 100 MG: 200 INJECTION, SOLUTION INTRAMUSCULAR at 14:48

## 2025-04-16 RX ORDER — TESTOSTERONE CYPIONATE 200 MG/ML
INJECTION, SOLUTION INTRAMUSCULAR
Qty: 2 ML | Refills: 0 | Status: SHIPPED | OUTPATIENT
Start: 2025-04-16

## 2025-04-16 NOTE — TELEPHONE ENCOUNTER
Upcoming Appts  With Family Medicine (DEVIN Weaver)  09/17/2025 at 7:30 AM  Last Office Visit - This Dept  3/17/2025 Lee Brunner APRN    UDS 10/8/24

## 2025-05-02 ENCOUNTER — CLINICAL SUPPORT (OUTPATIENT)
Dept: FAMILY MEDICINE CLINIC | Facility: CLINIC | Age: 45
End: 2025-05-02
Payer: COMMERCIAL

## 2025-05-02 DIAGNOSIS — R79.89 LOW TESTOSTERONE: Primary | ICD-10-CM

## 2025-05-02 RX ADMIN — TESTOSTERONE CYPIONATE 100 MG: 200 INJECTION, SOLUTION INTRAMUSCULAR at 15:13

## 2025-05-07 DIAGNOSIS — M19.90 OSTEOARTHRITIS, UNSPECIFIED OSTEOARTHRITIS TYPE, UNSPECIFIED SITE: ICD-10-CM

## 2025-05-07 RX ORDER — IBUPROFEN 800 MG/1
800 TABLET, FILM COATED ORAL 3 TIMES DAILY PRN
Qty: 180 TABLET | Refills: 0 | Status: SHIPPED | OUTPATIENT
Start: 2025-05-07

## 2025-05-23 ENCOUNTER — CLINICAL SUPPORT (OUTPATIENT)
Dept: FAMILY MEDICINE CLINIC | Facility: CLINIC | Age: 45
End: 2025-05-23
Payer: COMMERCIAL

## 2025-05-23 DIAGNOSIS — R79.89 LOW TESTOSTERONE: Primary | ICD-10-CM

## 2025-05-23 RX ADMIN — TESTOSTERONE CYPIONATE 100 MG: 200 INJECTION, SOLUTION INTRAMUSCULAR at 14:37

## 2025-05-27 ENCOUNTER — TELEPHONE (OUTPATIENT)
Dept: FAMILY MEDICINE CLINIC | Facility: CLINIC | Age: 45
End: 2025-05-27
Payer: COMMERCIAL

## 2025-05-27 NOTE — TELEPHONE ENCOUNTER
Patient called and was wanting to know why his Mounjaro was changed to Zepbound.  It is now requiring a prior auth but he already had prior auth for his Mounjaro.

## 2025-05-27 NOTE — TELEPHONE ENCOUNTER
Medication corrected and Mounjaro was sent to the pharmacy. Spoke with the tech and she will get this shipped out.

## 2025-06-13 RX ORDER — TESTOSTERONE CYPIONATE 200 MG/ML
100 INJECTION, SOLUTION INTRAMUSCULAR
Qty: 2 ML | Refills: 0 | Status: SHIPPED | OUTPATIENT
Start: 2025-06-13

## 2025-06-13 NOTE — TELEPHONE ENCOUNTER
Upcoming Appts  With Family Medicine (DEVIN Weaver)  09/17/2025 at 7:30 AM  Last Office Visit - This Dept  3/17/2025 Lee Brunner APRN    [facility] 10/8/2024

## 2025-06-17 ENCOUNTER — CLINICAL SUPPORT (OUTPATIENT)
Dept: FAMILY MEDICINE CLINIC | Facility: CLINIC | Age: 45
End: 2025-06-17
Payer: COMMERCIAL

## 2025-06-17 DIAGNOSIS — E34.9 TESTOSTERONE DEFICIENCY: Primary | ICD-10-CM

## 2025-06-17 PROCEDURE — 96372 THER/PROPH/DIAG INJ SC/IM: CPT | Performed by: NURSE PRACTITIONER

## 2025-06-17 RX ADMIN — TESTOSTERONE CYPIONATE 100 MG: 200 INJECTION, SOLUTION INTRAMUSCULAR at 15:49

## 2025-07-07 DIAGNOSIS — E11.9 TYPE 2 DIABETES MELLITUS WITHOUT COMPLICATION, WITHOUT LONG-TERM CURRENT USE OF INSULIN: Primary | ICD-10-CM
